# Patient Record
Sex: MALE | Race: WHITE | NOT HISPANIC OR LATINO | Employment: FULL TIME | URBAN - METROPOLITAN AREA
[De-identification: names, ages, dates, MRNs, and addresses within clinical notes are randomized per-mention and may not be internally consistent; named-entity substitution may affect disease eponyms.]

---

## 2017-01-09 ENCOUNTER — TELEPHONE (OUTPATIENT)
Dept: FAMILY MEDICINE | Facility: CLINIC | Age: 27
End: 2017-01-09

## 2017-01-09 NOTE — TELEPHONE ENCOUNTER
Spoke to Deuce letting him know that they never received the cardiac monitor back and it has been 30 days and will be marked lost after 45 days. Provided pt with the number to call [employer] at [phone].    Sagrario Worthy,

## 2017-01-09 NOTE — TELEPHONE ENCOUNTER
Kristin is calling from [employer] stating patient never returned cardiac monitor its been a month and will be marked lost on the 45th day. Thank you.

## 2017-01-23 ENCOUNTER — TELEPHONE (OUTPATIENT)
Dept: URGENT CARE | Facility: URGENT CARE | Age: 27
End: 2017-01-23

## 2017-01-23 NOTE — TELEPHONE ENCOUNTER
TC, please assist patient with cardiology appointment within 1 week per Meghan Dhaliwal NP.    Robyn Carlin RN

## 2017-01-23 NOTE — TELEPHONE ENCOUNTER
Was able to scheduled patient with Dr. Vasquez Pittman on Thurs 2/2/17 this is first available. Would you rather try and get him in sooner? You would have to do a doctor to doctor call.    Sagrario Worthy,

## 2017-01-23 NOTE — TELEPHONE ENCOUNTER
Patient is calling back to have provider help with scheduling the appointment with cardiology unable to get an appointment soon  Please call to discuss  Thank you

## 2017-02-02 ENCOUNTER — OFFICE VISIT (OUTPATIENT)
Dept: CARDIOLOGY | Facility: CLINIC | Age: 27
End: 2017-02-02
Attending: NURSE PRACTITIONER
Payer: COMMERCIAL

## 2017-02-02 VITALS
SYSTOLIC BLOOD PRESSURE: 113 MMHG | HEIGHT: 72 IN | WEIGHT: 202.5 LBS | DIASTOLIC BLOOD PRESSURE: 73 MMHG | HEART RATE: 86 BPM | OXYGEN SATURATION: 97 % | BODY MASS INDEX: 27.43 KG/M2

## 2017-02-02 DIAGNOSIS — R00.2 PALPITATIONS: Primary | ICD-10-CM

## 2017-02-02 LAB
ANION GAP SERPL CALCULATED.3IONS-SCNC: 9 MMOL/L (ref 3–14)
BUN SERPL-MCNC: 15 MG/DL (ref 7–30)
CALCIUM SERPL-MCNC: 8.8 MG/DL (ref 8.5–10.1)
CHLORIDE SERPL-SCNC: 107 MMOL/L (ref 94–109)
CO2 SERPL-SCNC: 26 MMOL/L (ref 20–32)
CREAT SERPL-MCNC: 0.91 MG/DL (ref 0.66–1.25)
ERYTHROCYTE [DISTWIDTH] IN BLOOD BY AUTOMATED COUNT: 13.4 % (ref 10–15)
GFR SERPL CREATININE-BSD FRML MDRD: NORMAL ML/MIN/1.7M2
GLUCOSE SERPL-MCNC: 93 MG/DL (ref 70–99)
HCT VFR BLD AUTO: 46.9 % (ref 40–53)
HGB BLD-MCNC: 16.6 G/DL (ref 13.3–17.7)
MAGNESIUM SERPL-MCNC: 2.5 MG/DL (ref 1.6–2.3)
MCH RBC QN AUTO: 30.7 PG (ref 26.5–33)
MCHC RBC AUTO-ENTMCNC: 35.4 G/DL (ref 31.5–36.5)
MCV RBC AUTO: 87 FL (ref 78–100)
PLATELET # BLD AUTO: 213 10E9/L (ref 150–450)
POTASSIUM SERPL-SCNC: 4.2 MMOL/L (ref 3.4–5.3)
RBC # BLD AUTO: 5.4 10E12/L (ref 4.4–5.9)
SODIUM SERPL-SCNC: 142 MMOL/L (ref 133–144)
TSH SERPL DL<=0.005 MIU/L-ACNC: 2.36 MU/L (ref 0.4–4)
WBC # BLD AUTO: 5.8 10E9/L (ref 4–11)

## 2017-02-02 PROCEDURE — 80048 BASIC METABOLIC PNL TOTAL CA: CPT | Performed by: INTERNAL MEDICINE

## 2017-02-02 PROCEDURE — 85027 COMPLETE CBC AUTOMATED: CPT | Performed by: INTERNAL MEDICINE

## 2017-02-02 PROCEDURE — 84443 ASSAY THYROID STIM HORMONE: CPT | Performed by: INTERNAL MEDICINE

## 2017-02-02 PROCEDURE — 36415 COLL VENOUS BLD VENIPUNCTURE: CPT | Performed by: INTERNAL MEDICINE

## 2017-02-02 PROCEDURE — 99203 OFFICE O/P NEW LOW 30 MIN: CPT | Mod: 25 | Performed by: INTERNAL MEDICINE

## 2017-02-02 PROCEDURE — 93000 ELECTROCARDIOGRAM COMPLETE: CPT | Performed by: INTERNAL MEDICINE

## 2017-02-02 PROCEDURE — 83735 ASSAY OF MAGNESIUM: CPT | Performed by: INTERNAL MEDICINE

## 2017-02-02 NOTE — MR AVS SNAPSHOT
After Visit Summary   2/2/2017    Deuce Cheung    MRN: [id number]           Patient Information     Date Of Birth          1990        Visit Information        Provider Department      2/2/2017 10:00 AM Vasquez Pittman MD [facility]        Today's Diagnoses     Palpitations    -  1        Follow-ups after your visit        Your next 10 appointments already scheduled     Feb 07, 2017  2:00 PM   Ech Complete with MGECHR1, MG ECHO TECH   [facility])    [address] [place] [address] [phone]           1.  Please bring or wear a comfortable two-piece outfit. 2.  You may eat, drink and take your normal medicines. 3.  For any questions that cannot be answered, please contact the ordering physician            Feb 07, 2017  3:00 PM   Event Monitor with MG DEVICE RM, MG CV TECH   [facility]    [address] [place] [address] [phone]              Future tests that were ordered for you today     Open Future Orders        Priority Expected Expires Ordered    Cardiac Event Monitor - Peds/Adult Routine  2/2/2018 2/2/2017    Echocardiogram Routine  2/2/2018 2/2/2017            Who to contact     If you have questions or need follow up information about today's clinic visit or your schedule please contact [facility] directly at [phone].  Normal or non-critical lab and imaging results will be communicated to you by MyChart, letter or phone within 4 business days after the clinic has received the results. If you do not hear from us within 7 days, please contact the clinic through MyChart or phone. If you have a critical or abnormal lab result, we will notify you by phone as soon as possible.  Submit refill requests through [employer] or call your pharmacy and they will forward the refill request to us. Please allow 3 business days for  your refill to be completed.          Additional Information About Your Visit        [employer] Information     [employer] gives you secure access to your electronic health record. If you see a primary care provider, you can also send messages to your care team and make appointments. If you have questions, please call your primary care clinic.  If you do not have a primary care provider, please call [phone] and they will assist you.      [employer] is an electronic gateway that provides easy, online access to your medical records. With [employer], you can request a clinic appointment, read your test results, renew a prescription or communicate with your care team.     To access your existing account, please contact your [facility] or call [phone] for assistance.        Care EveryWhere ID     This is your Care EveryWhere ID. This could be used by other organizations to access your [place] medical records  [id number]        Your Vitals Were     Pulse Height BMI (Body Mass Index) Pulse Oximetry          86 1.829 m (6') 27.46 kg/m2 97%         Blood Pressure from Last 3 Encounters:   02/02/17 113/73    Weight from Last 3 Encounters:   02/02/17 91.853 kg (202 lb 8 oz)   12/12/16 90.81 kg (200 lb 3.2 oz)              We Performed the Following     Basic metabolic panel     Cardiac Event Monitor - Peds/Adult     CBC with platelets     Magnesium     TSH with free T4 reflex        Primary Care Provider Office Phone # Fax #    [facility] [phone] [address] [facility] [address]        Thank you!     Thank you for choosing [facility]  for your care. Our goal is always to provide you with excellent care. Hearing back from our patients is one way we can continue to improve our services. Please take a few minutes to complete the written survey that you may receive in the mail after your visit with us. Thank you!             Your Updated  Medication List - Protect others around you: Learn how to safely use, store and throw away your medicines at www.disposemymeds.org.      Notice  As of 2/2/2017 10:45 AM    You have not been prescribed any medications.

## 2017-02-02 NOTE — NURSING NOTE
Deuce Cheung's goals for this visit include:   Chief Complaint   Patient presents with     Consult       He requests these members of his care team be copied on today's visit information: No    PCP: A[facility] [place]    Referring Provider:  PEEWEE Brar CNP  [address]  [place] [address]    Chief Complaint   Patient presents with     Consult       Initial Wt 91.853 kg (202 lb 8 oz) There is no height on file to calculate BMI.  BP completed using cuff size: regular

## 2017-02-02 NOTE — PROGRESS NOTES
"2017               PEEWEE William, CNP   [address], [address]   [place] [address]         RE:  Deuce Cheung   MRN:  [id number]   :  1990      Dr. Ms. Dhaliwal:      It was a pleasure participating in the care of your patient, Mr. Deuce Cheung.  As you know, he is a 26-year-old gentleman whom I see today for palpitations.      His past medical history is significant for the followin.  Left ankle fracture.      He denies other significant hospitalizations or surgeries.        His cardiac history is significant for a verbal history of some form of ablation for palpitations at a very young age when he was 6.  He says [facility].      He says that after his ablation, he was doing well until approximately 6-7 months ago when he began experiencing palpitations once again.  He says that he experiences his episodes as a \"stopping or quivering\" of his heart at which time he feels his vision blur and he becomes dizzy.  He has not passed out and he has not come close to passing out.  He says the symptoms last for about 5-10 minutes and they occur sporadically anywhere from once a week to 2-3 times a week.      He cannot correlate or predict when these episodes come on, however, the thing that most closely correlates is the amount of mental stress he is under and during stressful situations he may feel that these will trigger it.      He did wear a Zio Patch monitor on 2016, however, he did not experience any of the symptoms of dizziness during the monitoring.  The monitor revealed only normal sinus rhythm with rare ectopy.      He otherwise denies having any significant chest pain or problems breathing, PND, orthopnea, edema, syncope or near-syncope.      In terms of his cardiac risk factors, no history of diabetes or hypertension.  He smokes 1/2 pack a day and has done so for 6-7 " "years.  Rare alcohol use.  He drinks 1 can of pop and perhaps a cup of coffee once or twice a week.  Denies family history of heart disease.  He does not know his cholesterol.        He is a .  His wife works as an .      CURRENT MEDICATIONS:  He is on no medications.      PHYSICAL EXAMINATION:     VITAL SIGNS:  Blood pressure is 113/73 with a pulse of 86.  His weight is 202 pounds.   NECK:  Exam reveals no obvious jugular venous distention.   LUNGS:  Clear to auscultation.  Respiratory effort is normal.   CARDIAC:  Reveals a regular rate and rhythm and no obvious murmur or gallop appreciated.   ABDOMEN:  Belly soft, nontender.   EXTREMITIES:  Without gross edema.      EKG reveals normal sinus rhythm at a rate of 79 beats per minute, no gross ST changes.  Zio Patch monitor 12/12/2016 reveals normal sinus rhythm with greater PACs and PVCs.  Symptoms correlated to normal sinus rhythm plus or minus a PAC.  No labs.        IMPRESSION:      Deuce is a 26-year-old gentleman whose cardiac history is significant for a verbal history of having some form of electrophysiologic ablation procedure performed at age 6 at [facility].  Old records are not currently available for review.  He did well until 6-7 months ago when he began experiencing new palpitations described as a \"quivering\" of his heart that made him feel dizzy and with blurred vision.  He has not had true syncope or near-syncope.  The episodes occur for 5-10 minutes anywhere from once a week to 2-3 times a day.      Unfortunately, he did not experience any of his episodes while wearing a Zio Patch monitor in 12/2016, which was essentially unremarkable.      Further noninvasive evaluation would be indicated.        PLAN:     1.  Routine lab work including a chem panel, thyroid studies and magnesium.     2.  Echocardiogram to rule out significant structural pathology.     3.  A 30-day event monitor in " order to hopefully capture one of his episodes of dizziness and palpitations and to hopefully more definitively define an underlying rhythm and diagnosis.     4.  Further updates will follow pending the above test results.     5.  He was also encouraged to abstain from caffeine, alcohol and smoking.      Once again, it was a pleasure participating in the care of your patient, Mr. Deuce Gastelum.  Please feel free to contact me anytime if you have questions regarding his care in the future.         Sincerely,      NADIYA HUNTER MD             D: 2017 10:39   T: 2017 12:30   MT:       Name:     DEUCE GASTELUM   MRN:      [id number]        Account:      [id number]   :      1990      Document: [id number]       cc: Meghan VIDES, CNP

## 2017-02-07 ENCOUNTER — ALLIED HEALTH/NURSE VISIT (OUTPATIENT)
Dept: CARDIOLOGY | Facility: CLINIC | Age: 27
End: 2017-02-07
Attending: INTERNAL MEDICINE
Payer: COMMERCIAL

## 2017-02-07 DIAGNOSIS — R00.2 PALPITATIONS: ICD-10-CM

## 2017-02-07 PROCEDURE — 93272 ECG/REVIEW INTERPRET ONLY: CPT

## 2017-02-07 PROCEDURE — 93270 REMOTE 30 DAY ECG REV/REPORT: CPT

## 2017-02-07 PROCEDURE — 93306 TTE W/DOPPLER COMPLETE: CPT

## 2017-02-07 RX ADMIN — Medication 3 ML: at 14:45

## 2017-02-07 NOTE — NURSING NOTE
Patient presents today for resting echo ordered by MD.     Echo Tech provided patient education about resting echo. IV started in RAC.   IV start documented in  [employer].  Echo technician completed resting echo. Patient monitored according to Optison protocol. Data transferred to [facility] for final interpretation through [employer].     Optison medication:  Amount used 3ml Optison mixed with 6ml Saline - [id number]   After completion of resting echo, IV removed.    Patient education provided about cardiology interpretation and primary provider will be notified of results.    Loni Foreman RDCS

## 2018-03-13 ENCOUNTER — OFFICE VISIT (OUTPATIENT)
Dept: FAMILY MEDICINE | Facility: CLINIC | Age: 28
End: 2018-03-13
Payer: COMMERCIAL

## 2018-03-13 ENCOUNTER — TELEPHONE (OUTPATIENT)
Dept: FAMILY MEDICINE | Facility: CLINIC | Age: 28
End: 2018-03-13

## 2018-03-13 VITALS
OXYGEN SATURATION: 98 % | SYSTOLIC BLOOD PRESSURE: 121 MMHG | DIASTOLIC BLOOD PRESSURE: 77 MMHG | BODY MASS INDEX: 28.04 KG/M2 | HEIGHT: 68 IN | HEART RATE: 69 BPM | TEMPERATURE: 97.2 F | WEIGHT: 185 LBS

## 2018-03-13 DIAGNOSIS — Z23 NEED FOR TDAP VACCINATION: ICD-10-CM

## 2018-03-13 DIAGNOSIS — G44.219 EPISODIC TENSION-TYPE HEADACHE, NOT INTRACTABLE: Primary | ICD-10-CM

## 2018-03-13 LAB
BASOPHILS # BLD AUTO: 0 10E9/L (ref 0–0.2)
BASOPHILS NFR BLD AUTO: 0.1 %
DIFFERENTIAL METHOD BLD: NORMAL
EOSINOPHIL # BLD AUTO: 0.3 10E9/L (ref 0–0.7)
EOSINOPHIL NFR BLD AUTO: 3.8 %
ERYTHROCYTE [DISTWIDTH] IN BLOOD BY AUTOMATED COUNT: 14 % (ref 10–15)
HCT VFR BLD AUTO: 49.6 % (ref 40–53)
HGB BLD-MCNC: 16.8 G/DL (ref 13.3–17.7)
LYMPHOCYTES # BLD AUTO: 2.2 10E9/L (ref 0.8–5.3)
LYMPHOCYTES NFR BLD AUTO: 31.9 %
MCH RBC QN AUTO: 30.5 PG (ref 26.5–33)
MCHC RBC AUTO-ENTMCNC: 33.9 G/DL (ref 31.5–36.5)
MCV RBC AUTO: 90 FL (ref 78–100)
MONOCYTES # BLD AUTO: 0.7 10E9/L (ref 0–1.3)
MONOCYTES NFR BLD AUTO: 9.5 %
NEUTROPHILS # BLD AUTO: 3.8 10E9/L (ref 1.6–8.3)
NEUTROPHILS NFR BLD AUTO: 54.7 %
PLATELET # BLD AUTO: 195 10E9/L (ref 150–450)
RBC # BLD AUTO: 5.51 10E12/L (ref 4.4–5.9)
WBC # BLD AUTO: 6.9 10E9/L (ref 4–11)

## 2018-03-13 PROCEDURE — 84443 ASSAY THYROID STIM HORMONE: CPT | Performed by: PHYSICIAN ASSISTANT

## 2018-03-13 PROCEDURE — 90715 TDAP VACCINE 7 YRS/> IM: CPT | Performed by: PHYSICIAN ASSISTANT

## 2018-03-13 PROCEDURE — 99214 OFFICE O/P EST MOD 30 MIN: CPT | Mod: 25 | Performed by: PHYSICIAN ASSISTANT

## 2018-03-13 PROCEDURE — 36415 COLL VENOUS BLD VENIPUNCTURE: CPT | Performed by: PHYSICIAN ASSISTANT

## 2018-03-13 PROCEDURE — 80053 COMPREHEN METABOLIC PANEL: CPT | Performed by: PHYSICIAN ASSISTANT

## 2018-03-13 PROCEDURE — 90471 IMMUNIZATION ADMIN: CPT | Performed by: PHYSICIAN ASSISTANT

## 2018-03-13 PROCEDURE — 85025 COMPLETE CBC W/AUTO DIFF WBC: CPT | Performed by: PHYSICIAN ASSISTANT

## 2018-03-13 RX ORDER — CYCLOBENZAPRINE HCL 10 MG
5-10 TABLET ORAL 3 TIMES DAILY PRN
Qty: 90 TABLET | Refills: 1 | Status: SHIPPED | OUTPATIENT
Start: 2018-03-13 | End: 2018-12-19

## 2018-03-13 NOTE — TELEPHONE ENCOUNTER
Please triage patient. If first/worst headache of life, he needs to go to emergency room.     Michelle Treviño PA-C

## 2018-03-13 NOTE — PROGRESS NOTES
SUBJECTIVE:   Deuce Cheung is a 27 year old male who presents to clinic today for the following health issues:      Patient with history of palptiations who has seen cardiology and told to f/u pRN who now has headaches:      Headaches      Duration: Last couple of months patient has been having headaches once every two weeks, last two days pt has had severe headaches     Description  Location: low back of head and in the ears   Character: throbbing pain, sharp pain, squeezing pain  Frequency:  Throbs, then goes away, 1 every 2-3 minutes. Then every 10-15 seconds this morning   Duration:  All day. All night    Intensity:  2/10 right now 8/10 at worse     Accompanying signs and symptoms:    Precipitating or Alleviating factors:  Nausea/vomiting: Nausea yesterday   Dizziness: occasionally  Weakness or numbness: no  Visual changes: none  Fever: no   Sinus or URI symptoms no     History  Head trauma: no  Family history of migraines: no  Previous tests for headaches: no  Neurologist evaluations: no  Able to do daily activities when headache present: yes but struggled   Wake with headaches: YES  Daily pain medication use: YES- advil, tylenol PM, Excedrin   Any changes in: Yes- stress. Fraud.     Precipitating or Alleviating factors (light/sound/sleep/caffeine): hard to tell    Therapies tried and outcome: Ibuprofen (Advil, Motrin), Tylenol and Excedrin    Outcome - Advil most effective- dull  Frequent/daily pain medication use: YES- just for the headache        Was playing paintball wearing heavy gear then got headache the next day.  Lots of tension in neck.   No arm pain or numbness/tingling. No arm weakness.   No chest pain or shortness of breath.   Did have thyroid and labs checked a year ago with palpitations. Headaches started after per patient but have been going on for months off and on.     No lifestyle changes per patient.    Some extra stress, sister in law lives with them.  patient denies anxiety/depression  "but he seems like he has RAUL or at least anxiety regarding medical diagnosis just with talking to him today.      Has not seen neurologist.       Not the worst headache he has ever had.   Patient has no current headache, used a heating beanie on his neck and headache resolved.         Problem list and histories reviewed & adjusted, as indicated.  Additional history: as documented    There is no problem list on file for this patient.    History reviewed. No pertinent surgical history.    Social History   Substance Use Topics     Smoking status: Former Smoker     Smokeless tobacco: Never Used     Alcohol use Not on file     Family History   Problem Relation Age of Onset     Celiac Disease Maternal Grandmother          Current Outpatient Prescriptions   Medication Sig Dispense Refill     cyclobenzaprine (FLEXERIL) 10 MG tablet Take 0.5-1 tablets (5-10 mg) by mouth 3 times daily as needed for muscle spasms 90 tablet 1     No Known Allergies    Reviewed and updated as needed this visit by clinical staff       Reviewed and updated as needed this visit by Provider         ROS:  Constitutional, HEENT, cardiovascular, pulmonary, GI, , musculoskeletal, neuro, skin, endocrine and psych systems are negative, except as otherwise noted.    OBJECTIVE:     /77  Pulse 69  Temp 97.2  F (36.2  C) (Oral)  Ht 5' 8\" (1.727 m)  Wt 185 lb (83.9 kg)  SpO2 98%  BMI 28.13 kg/m2  Body mass index is 28.13 kg/(m^2).  GENERAL: healthy, alert and no distress  EYES: Eyes grossly normal to inspection, PERRL and conjunctivae and sclerae normal  HENT: ear canals and TM's normal, nose and mouth without ulcers or lesions  NECK: no adenopathy, no asymmetry, masses, or scars and thyroid normal to palpation  RESP: lungs clear to auscultation - no rales, rhonchi or wheezes  CV: regular rate and rhythm, normal S1 S2, no S3 or S4, no murmur, click or rub, no peripheral edema and peripheral pulses strong  MS: no gross musculoskeletal defects " noted, no edema  NEURO: Normal strength and tone, sensory exam grossly normal, mentation intact, speech normal, cranial nerves 2-12 intact, Romberg normal and rapid alternating movements normal  PSYCH: {alert, pleasant, seems mildly anxious/has lots of questions regarding things he could have/what he read on the Internet         ASSESSMENT/PLAN:   ASSESSMENT / PLAN:  (G44.219) Episodic tension-type headache, not intractable  (primary encounter diagnosis)  Comment: suspect tension/stress headaches and possibly migraines, no evidence of stroke or acute pathology as the cause, neuro exam normal and vitals normal.  PATIENT HAS NO HEADACHE CURRENTLy.   See more below  Plan: CBC with platelets differential, Comprehensive         metabolic panel, TSH with free T4 reflex,         NEUROLOGY ADULT REFERRAL, cyclobenzaprine         (FLEXERIL) 10 MG tablet, TDAP VACCINE (ADACEL),        ADMIN 1st VACCINE, SCREENING QUESTIONS FOR         ADULT IMMUNIZATIONS            (Z23) Need for Tdap vaccination  Comment:   Plan:     Patient Instructions   Do not mix flexeril with alcohol or drive after taking (muscle relaxant)    Continue heat topically    To emergency room with worsening or new symptoms    Schedule with neurology            Billin min spent face-to-face with patient. 20 min on history, 4 on exam, 1 on discussing diagnosis and treatment plan.       Michelle Treviño PA-C  [facility]

## 2018-03-13 NOTE — PATIENT INSTRUCTIONS
Do not mix flexeril with alcohol or drive after taking (muscle relaxant)    Continue heat topically    To emergency room with worsening or new symptoms    Schedule with neurology

## 2018-03-13 NOTE — MR AVS SNAPSHOT
After Visit Summary   3/13/2018    Deuce Cheung    MRN: [id number]           Patient Information     Date Of Birth          1990        Visit Information        Provider Department      3/13/2018 5:00 PM Michelle Treviño PA-C [facility]        Today's Diagnoses     Episodic tension-type headache, not intractable    -  1    Need for Tdap vaccination          Care Instructions    Do not mix flexeril with alcohol or drive after taking (muscle relaxant)    Continue heat topically    To emergency room with worsening or new symptoms    Schedule with neurology                  Follow-ups after your visit        Additional Services     NEUROLOGY ADULT REFERRAL       Your provider has referred you for the following:   Consult at [facility]: [facility] - [place] [phone]   [email or web address]    Please be aware that coverage of these services is subject to the terms and limitations of your health insurance plan.  Call member services at your health plan with any benefit or coverage questions.      Please bring the following with you to your appointment:    (1) Any X-Rays, CTs or MRIs which have been performed.  Contact the facility where they were done to arrange for  prior to your scheduled appointment.    (2) List of current medications  (3) This referral request   (4) Any documents/labs given to you for this referral                  Who to contact     If you have questions or need follow up information about today's clinic visit or your schedule please contact [facility] directly at [phone].  Normal or non-critical lab and imaging results will be communicated to you by MyChart, letter or phone within 4 business days after the clinic has received the results. If you do not hear from us within 7 days, please contact the clinic through MyChart or phone. If you have a critical or abnormal lab result, we  "will notify you by phone as soon as possible.  Submit refill requests through [employer] or call your pharmacy and they will forward the refill request to us. Please allow 3 business days for your refill to be completed.          Additional Information About Your Visit        [employer] Information     [employer] gives you secure access to your electronic health record. If you see a primary care provider, you can also send messages to your care team and make appointments. If you have questions, please call your primary care clinic.  If you do not have a primary care provider, please call [phone] and they will assist you.        Care EveryWhere ID     This is your Care EveryWhere ID. This could be used by other organizations to access your [place] medical records  [id number]        Your Vitals Were     Pulse Temperature Height Pulse Oximetry BMI (Body Mass Index)       69 97.2  F (36.2  C) (Oral) 5' 8\" (1.727 m) 98% 28.13 kg/m2        Blood Pressure from Last 3 Encounters:   03/13/18 121/77   02/02/17 113/73    Weight from Last 3 Encounters:   03/13/18 185 lb (83.9 kg)   02/02/17 202 lb 8 oz (91.9 kg)   12/12/16 200 lb 3.2 oz (90.8 kg)              We Performed the Following     ADMIN 1st VACCINE     CBC with platelets differential     Comprehensive metabolic panel     NEUROLOGY ADULT REFERRAL     SCREENING QUESTIONS FOR ADULT IMMUNIZATIONS     TDAP VACCINE (ADACEL)     TSH with free T4 reflex          Today's Medication Changes          These changes are accurate as of 3/13/18  6:06 PM.  If you have any questions, ask your nurse or doctor.               Start taking these medicines.        Dose/Directions    cyclobenzaprine 10 MG tablet   Commonly known as:  FLEXERIL   Used for:  Episodic tension-type headache, not intractable   Started by:  Michelle Treviño PA-C        Dose:  5-10 mg   Take 0.5-1 tablets (5-10 mg) by mouth 3 times daily as needed for muscle spasms   Quantity:  90 tablet   Refills:  1       "      Where to get your medicines      These medications were sent to [facility] - [place], [place] - [address] [address]  [address] [address] [address]     Phone:  [phone]     cyclobenzaprine 10 MG tablet                Primary Care Provider Office Phone # Fax #    [facility] [phone] [address] [facility] [address]        Equal Access to Services     SHERRIE CHOW : Hadii aad ku hadasho Soomaali, waaxda luqadaha, qaybta kaalmada adeegyada, waxay idiin hayaan adeeg kharapeter lachet . So [facility] [phone].    ATENCIÓN: Si habla español, tiene a burger disposición servicios gratuitos de asistencia lingüística. [facility] [phone].    We comply with applicable federal civil rights laws and [place] laws. We do not discriminate on the basis of race, color, national origin, age, disability, sex, sexual orientation, or gender identity.            Thank you!     Thank you for choosing [facility]  for your care. Our goal is always to provide you with excellent care. Hearing back from our patients is one way we can continue to improve our services. Please take a few minutes to complete the written survey that you may receive in the mail after your visit with us. Thank you!             Your Updated Medication List - Protect others around you: Learn how to safely use, store and throw away your medicines at www.disposemymeds.org.          This list is accurate as of 3/13/18  6:06 PM.  Always use your most recent med list.                   Brand Name Dispense Instructions for use Diagnosis    cyclobenzaprine 10 MG tablet    FLEXERIL    90 tablet    Take 0.5-1 tablets (5-10 mg) by mouth 3 times daily as needed for muscle spasms    Episodic tension-type headache, not intractable

## 2018-03-13 NOTE — LETTER
[facility]  [address] [facility] [address]  Phone: [phone]    March 13, 2018        Deuce Cheung  [address]   [facility] [address]          To whom it may concern:    RE: Deuce Cheung    Patient was seen and treated today at our clinic and missed work for acute illness.  They may be excused yesterday through Wednesday 3-14-18 and return when symptoms improve.       Please contact me for questions or concerns.      Sincerely,        Michelle Treviño PA-C

## 2018-03-13 NOTE — NURSING NOTE
"Chief Complaint   Patient presents with     Headache       Initial /77  Pulse 69  Temp 97.2  F (36.2  C) (Oral)  Ht 5' 8\" (1.727 m)  Wt 185 lb (83.9 kg)  SpO2 98%  BMI 28.13 kg/m2 Estimated body mass index is 28.13 kg/(m^2) as calculated from the following:    Height as of this encounter: 5' 8\" (1.727 m).    Weight as of this encounter: 185 lb (83.9 kg).  Medication Reconciliation: complete    Noreen Hayden MA    "

## 2018-03-13 NOTE — TELEPHONE ENCOUNTER
This writer attempted to contact pt on 03/13/18 at 1:44pm      Reason for call SYMPTOMS - triage per provider request below (pt has a scheduled 5pm appointment) and left message to return call and request to speak to RN.      If patient calls back:   [facility] RN team to see if anyone is available to take the call, and if RN team unavailable please transfer to [facility] for triage.    Therese Cueto, RN

## 2018-03-14 LAB
ALBUMIN SERPL-MCNC: 4.2 G/DL (ref 3.4–5)
ALP SERPL-CCNC: 39 U/L (ref 40–150)
ALT SERPL W P-5'-P-CCNC: 37 U/L (ref 0–70)
ANION GAP SERPL CALCULATED.3IONS-SCNC: 7 MMOL/L (ref 3–14)
AST SERPL W P-5'-P-CCNC: 24 U/L (ref 0–45)
BILIRUB SERPL-MCNC: 0.4 MG/DL (ref 0.2–1.3)
BUN SERPL-MCNC: 14 MG/DL (ref 7–30)
CALCIUM SERPL-MCNC: 8.9 MG/DL (ref 8.5–10.1)
CHLORIDE SERPL-SCNC: 107 MMOL/L (ref 94–109)
CO2 SERPL-SCNC: 28 MMOL/L (ref 20–32)
CREAT SERPL-MCNC: 0.95 MG/DL (ref 0.66–1.25)
GFR SERPL CREATININE-BSD FRML MDRD: >90 ML/MIN/1.7M2
GLUCOSE SERPL-MCNC: 81 MG/DL (ref 70–99)
POTASSIUM SERPL-SCNC: 4.3 MMOL/L (ref 3.4–5.3)
PROT SERPL-MCNC: 7.4 G/DL (ref 6.8–8.8)
SODIUM SERPL-SCNC: 142 MMOL/L (ref 133–144)
TSH SERPL DL<=0.005 MIU/L-ACNC: 1.83 MU/L (ref 0.4–4)

## 2018-03-14 NOTE — PROGRESS NOTES
Lasha Tripathi,       Your recent test results are attached, if you have any questions or concerns please feel free to contact me via e-mail or call [phone].  Labs normal. Thyroid normal. White and red blood cell counts normal.  No anemia.  Sodium, potassium, kidney and liver function normal.  Blood sugar normal, no diabetes.             It was a pleasure to see you at your recent office visit.      Sincerely,  Michelle Treviño PA-C

## 2018-12-19 ENCOUNTER — OFFICE VISIT (OUTPATIENT)
Dept: FAMILY MEDICINE | Facility: CLINIC | Age: 28
End: 2018-12-19
Payer: COMMERCIAL

## 2018-12-19 VITALS
SYSTOLIC BLOOD PRESSURE: 122 MMHG | BODY MASS INDEX: 27.74 KG/M2 | HEART RATE: 68 BPM | TEMPERATURE: 98 F | RESPIRATION RATE: 16 BRPM | HEIGHT: 68 IN | WEIGHT: 183 LBS | DIASTOLIC BLOOD PRESSURE: 78 MMHG | OXYGEN SATURATION: 99 %

## 2018-12-19 DIAGNOSIS — N50.812 TESTICULAR PAIN, LEFT: Primary | ICD-10-CM

## 2018-12-19 PROCEDURE — 99213 OFFICE O/P EST LOW 20 MIN: CPT | Performed by: PHYSICIAN ASSISTANT

## 2018-12-19 ASSESSMENT — MIFFLIN-ST. JEOR: SCORE: 1769.58

## 2018-12-19 NOTE — PROGRESS NOTES
"SUBJECTIVE:                                                    Deuce Cheung is a 28 year old male who presents to clinic today for the following health issues:    Testicle Pain       Duration: 2 years off and on     Description (location/character/radiation): left sided    Intensity:  moderate    Accompanying signs and symptoms:     History (similar episodes/previous evaluation): None    Precipitating or alleviating factors: pt was shot with a paintball gun x2.     Therapies tried and outcome: None   Pain comes and goes. Increase pain with touching testicle.   No dysuria or hematuria or abdominal pain.     Problem list and histories reviewed & adjusted, as indicated.  Additional history: as documented    There is no problem list on file for this patient.    No past surgical history on file.    Social History     Tobacco Use     Smoking status: Former Smoker     Smokeless tobacco: Never Used   Substance Use Topics     Alcohol use: Not on file     Family History   Problem Relation Age of Onset     Celiac Disease Maternal Grandmother          No current outpatient medications on file.     No Known Allergies    ROS:  Constitutional, HEENT, cardiovascular, pulmonary, gi and gu systems are negative, except as otherwise noted.    OBJECTIVE:     /78   Pulse 68   Temp 98  F (36.7  C) (Oral)   Resp 16   Ht 1.727 m (5' 8\")   Wt 83 kg (183 lb)   SpO2 99%   BMI 27.83 kg/m    Body mass index is 27.83 kg/m .  GENERAL: healthy, alert and no distress  RESP: lungs clear to auscultation - no rales, rhonchi or wheezes  CV: regular rate and rhythm, normal S1 S2, no S3 or S4, no murmur, click or rub, no peripheral edema and peripheral pulses strong  ABDOMEN: soft, nontender, no hepatosplenomegaly, no masses and bowel sounds normal   (male): normal male genitalia without lesions or urethral discharge, no hernia  MS: no gross musculoskeletal defects noted, no edema    Diagnostic Test Results:  Ultrasound " pending    ASSESSMENT/PLAN:       ICD-10-CM    1. Testicular pain, left N50.812 US Testicular and Scrotum   Ultrasound pending and fu with Urology  Discussed groin protection.     Shahram Hunter PA-C  [facility]

## 2018-12-19 NOTE — NURSING NOTE
"Chief Complaint   Patient presents with     Groin Swelling     Health Maintenance     flu       Initial /78   Pulse 68   Temp 98  F (36.7  C) (Oral)   Resp 16   Ht 1.727 m (5' 8\")   Wt 83 kg (183 lb)   SpO2 99%   BMI 27.83 kg/m   Estimated body mass index is 27.83 kg/m  as calculated from the following:    Height as of this encounter: 1.727 m (5' 8\").    Weight as of this encounter: 83 kg (183 lb).  Medication Reconciliation: complete  Kacey Marshall CMA    "

## 2020-02-23 ENCOUNTER — HEALTH MAINTENANCE LETTER (OUTPATIENT)
Age: 30
End: 2020-02-23

## 2020-04-17 ENCOUNTER — VIRTUAL VISIT (OUTPATIENT)
Dept: FAMILY MEDICINE | Facility: OTHER | Age: 30
End: 2020-04-17

## 2020-04-17 NOTE — PROGRESS NOTES
"Date: 2020 14:02:25  Clinician: Deborah Salvador  Clinician NPI: [id number]  Patient: Deuce Cheung  Patient : 1990  Patient Address: [address] [place] [address]  Patient Phone: [phone]  Visit Protocol: IBS  Patient Summary:  Deuce is a 29 year old ( : 1990 ) male who initiated a Visit for evaluation of IBS. When asked the question \"Please sign me up to receive news, health information and promotions from [employer].\", Deuce responded \"No\".    In the last 3 months, he notes experiencing abdominal pain or discomfort everyday. He has been experiencing discomfort or pain for less than 6 months. With regard to the abdominal pain, the patient notes:     The pain sometimes got better or stopped completely after a bowl movement    Sometimes having more frequent bowel movements after the pain started    Sometimes having less frequent bowel movements after the pain started    Often having looser or less solid stools when the pain started    Never having harder stools when the pain started     In the last 3 months, he experienced the following:     Hard or lumpy stools: about 25% of the time     Loose, mushy or watery stools: about 50% of the time     Blood in the stool: never     Black stools: never     Vomited blood: never     Sharp abdominal pain     The patient denies the following: abdominal pain that interferes with sleep, diarrhea that interferes with sleep, pain with urination, feeling feverish, recent abdominal trauma or injury, increased urination frequency, reflux, heartburn, unintentional weight loss, and back pain associated with stomach symptoms.   The patient has NOT modified his diet to treat these symptoms.   The patient has a parent, brother, or sister who has or has had Celiac disease.   The patient does not smoke or use smokeless tobacco.   Additional information as reported by the patient (free text): just a constant dull pain in my abdomen with occasional more " extreme sharp pains. Pain started more centralized directly below my diaphragm area but has moved to the right side of my abdomen. I've only noticed an occasional mild fever and my pain has never been severe, just nagging. Seems to feel better momentarily after eating but gets worse about half hour to an hour after eating. I have not taken any over the counter meds to counter pain.     MEDICATIONS: No current medications, ALLERGIES: NKDA  Clinician Response:  Dear Deuce,  Based on the information you provided, you likely have a Functional Digestive Disorder, which is a general term describing abnormal function of the bowels.  Unless you are allergic to the over-the-counter medication(s) below, I recommend using:       Methylcellulose (Citrucel). Take 1 scoop orally 1-3 times a day mixed with water. This is an over-the-counter medication that does not require a prescription.      Polyethylene glycol (Miralax). Take 17g dissolved in 8oz of water once a day as needed. Once bowel movements are soft, reduce or eliminate dosing/frequency. This is an over-the-counter medication that does not require a prescription.     If you do not experience any improvement within two to four weeks, you should be seen by your primary care provider.   If you develop worsening abdominal pain with nausea, vomiting, or blood in stool, be seen by your primary care provider, urgent care clinic, or emergency department.   Diagnosis: Other functional disorders of intestine  Diagnosis ICD: K59.8

## 2020-04-21 ENCOUNTER — OFFICE VISIT (OUTPATIENT)
Dept: URGENT CARE | Facility: URGENT CARE | Age: 30
End: 2020-04-21
Payer: COMMERCIAL

## 2020-04-21 ENCOUNTER — E-VISIT (OUTPATIENT)
Dept: FAMILY MEDICINE | Facility: CLINIC | Age: 30
End: 2020-04-21
Payer: COMMERCIAL

## 2020-04-21 ENCOUNTER — ANCILLARY PROCEDURE (OUTPATIENT)
Dept: GENERAL RADIOLOGY | Facility: CLINIC | Age: 30
End: 2020-04-21
Attending: INTERNAL MEDICINE
Payer: COMMERCIAL

## 2020-04-21 VITALS
SYSTOLIC BLOOD PRESSURE: 110 MMHG | DIASTOLIC BLOOD PRESSURE: 78 MMHG | TEMPERATURE: 97.4 F | HEART RATE: 78 BPM | OXYGEN SATURATION: 99 %

## 2020-04-21 DIAGNOSIS — R10.84 ABDOMINAL PAIN, GENERALIZED: Primary | ICD-10-CM

## 2020-04-21 DIAGNOSIS — K59.00 CONSTIPATION, UNSPECIFIED CONSTIPATION TYPE: ICD-10-CM

## 2020-04-21 DIAGNOSIS — Z53.9 ERRONEOUS ENCOUNTER--DISREGARD: Primary | ICD-10-CM

## 2020-04-21 DIAGNOSIS — R10.84 ABDOMINAL PAIN, GENERALIZED: ICD-10-CM

## 2020-04-21 PROCEDURE — 99214 OFFICE O/P EST MOD 30 MIN: CPT | Performed by: INTERNAL MEDICINE

## 2020-04-21 PROCEDURE — 74019 RADEX ABDOMEN 2 VIEWS: CPT

## 2020-04-21 RX ORDER — BISACODYL 10 MG
10 SUPPOSITORY, RECTAL RECTAL DAILY PRN
Qty: 5 SUPPOSITORY | Refills: 0 | Status: SHIPPED | OUTPATIENT
Start: 2020-04-21 | End: 2021-10-18

## 2020-04-21 RX ORDER — DOCUSATE SODIUM 100 MG/1
100 CAPSULE, LIQUID FILLED ORAL 2 TIMES DAILY
Qty: 60 CAPSULE | Refills: 0 | Status: SHIPPED | OUTPATIENT
Start: 2020-04-21 | End: 2021-10-18

## 2020-04-21 NOTE — PROGRESS NOTES
SUBJECTIVE:  Deuce Cheung is an 29 year old male who presents for abdominal pain.  Started about three weeks ago.  Was on keto diet for a while and then a few weeks ago stopped the keto diet.  acouple days later had some abdominal pain for about 4 days, then improved.  Then a few days later pain recurred again.  Is not having very frequent BMs.  Pain has gradually worsened and feels bloated constantly and then will have sharper more focused pains for a few minutes but not more than 30 minutes.  No vomiting.  No nausea unless the pain is very severe, but then passes.  No fevers.  No cough or runny nose.  Did an oncare visit and advised to take miralax daily which he has been doing, but hasn't relieved his sxs.  Since starting miralax has had a very small BM daily but not normal amount.   No other meds taken for sxs. No blood or mucous in stools.  No black stools.  No recent travel.  No one else at home with similar sxs.  Eating okay but after eats will get burst of pain about 30 minutes later.      PMH:  neg    Social History     Socioeconomic History     Marital status:      Spouse name: Not on file     Number of children: Not on file     Years of education: Not on file     Highest education level: Not on file   Occupational History     Not on file   Social Needs     Financial resource strain: Not on file     Food insecurity     Worry: Not on file     Inability: Not on file     Transportation needs     Medical: Not on file     Non-medical: Not on file   Tobacco Use     Smoking status: Former Smoker     Smokeless tobacco: Never Used   Substance and Sexual Activity     Alcohol use: Not on file     Drug use: Not on file     Sexual activity: Not on file   Lifestyle     Physical activity     Days per week: Not on file     Minutes per session: Not on file     Stress: Not on file   Relationships     Social connections     Talks on phone: Not on file     Gets together: Not on file     Attends [employer]ism service: Not  on file     Active member of club or organization: Not on file     Attends meetings of clubs or organizations: Not on file     Relationship status: Not on file     Intimate partner violence     Fear of current or ex partner: Not on file     Emotionally abused: Not on file     Physically abused: Not on file     Forced sexual activity: Not on file   Other Topics Concern     Parent/sibling w/ CABG, MI or angioplasty before 65F 55M? Not Asked   Social History Narrative     Not on file     Family History   Problem Relation Age of Onset     Celiac Disease Maternal Grandmother        ALLERGIES:  Patient has no known allergies.    No current outpatient medications on file.     No current facility-administered medications for this visit.          ROS:  ROS is done and is negative for general/constitutional, eye, ENT, Respiratory, cardiovascular, GI, , Skin, musculoskeletal except as noted elsewhere.  All other review of systems negative except as noted elsewhere.      OBJECTIVE:  /78   Pulse 78   Temp 97.4  F (36.3  C)   SpO2 99%   GENERAL APPEARANCE: Alert, in no acute distress  EYES: normal  NOSE:normal  OROPHARYNX:normal  NECK:No adenopathy,masses or thyromegaly  RESP: normal and clear to auscultation  CV:regular rate and rhythm and no murmurs, clicks, or gallops  ABDOMEN: Abdomen soft. BS normal. Mild to moderate tenderness over left upper and lower abdomen, no rebound. No masses, organomegaly  SKIN: no ulcers, lesions or rash  MUSCULOSKELETAL:Musculoskeletal normal      RESULTS  Xrays of abdomen : moderate stool present, no free air noted, on my reading.  Await radiology reading      ASSESSMENT/PLAN:    ASSESSMENT / PLAN:  (R10.84) Abdominal pain, generalized  (primary encounter diagnosis)  Comment: currently most c/w constipation.  Currently not c/w appendicitis based on lack of fevers, location of pain, and length of sxs.  Plan: XR Abdomen 2 Views        Treat as below.    (K59.00) Constipation, unspecified  constipation type  Comment: sxs c/w constipation, likely triggered by dietary changes recently.  Will have pt increase miralax to bid for 3-5 days and add colace and prn culcolax  Plan: docusate sodium (COLACE) 100 MG capsule,         bisacodyl (DULCOLAX) 10 MG suppository        Reviewed medication instructions and side effects. Follow up if experiences side effects.  See pt instructions for miralax and miralax taper.  I reviewed supportive care, otc meds to use if needed, expected course, and signs of concern.  Follow up as needed or if he does not improve within 7 day(s) or if worsens in any way.  Reviewed red flag symptoms and is to go to the ER if experiences any of these.      See [facility] for orders, medications, letters, patient instructions    Michelle Cruz M.D.

## 2020-04-21 NOTE — PATIENT INSTRUCTIONS
Take miralax full dose twice a day for 3-5 days.  Then, if stools are too loose, decrease to 1 dose daily.  If stools are still too loose, then decrease to 1/2 dose every day, then 1/2 dose every other day until stools are no longer too loose.  You may take a full dose of miralax daily if needed, and can take it for as long as needed.    You need to take at least 1/2 dose twice a week for a month to allow time for your bowel to recover and return to normal function.

## 2020-12-06 ENCOUNTER — HEALTH MAINTENANCE LETTER (OUTPATIENT)
Age: 30
End: 2020-12-06

## 2021-04-11 ENCOUNTER — HEALTH MAINTENANCE LETTER (OUTPATIENT)
Age: 31
End: 2021-04-11

## 2021-09-25 ENCOUNTER — HEALTH MAINTENANCE LETTER (OUTPATIENT)
Age: 31
End: 2021-09-25

## 2021-10-18 ENCOUNTER — OFFICE VISIT (OUTPATIENT)
Dept: FAMILY MEDICINE | Facility: CLINIC | Age: 31
End: 2021-10-18
Payer: COMMERCIAL

## 2021-10-18 VITALS
WEIGHT: 192 LBS | SYSTOLIC BLOOD PRESSURE: 126 MMHG | DIASTOLIC BLOOD PRESSURE: 86 MMHG | TEMPERATURE: 98.1 F | BODY MASS INDEX: 29.1 KG/M2 | HEART RATE: 87 BPM | HEIGHT: 68 IN | OXYGEN SATURATION: 100 %

## 2021-10-18 DIAGNOSIS — M54.6 ACUTE RIGHT-SIDED THORACIC BACK PAIN: Primary | ICD-10-CM

## 2021-10-18 DIAGNOSIS — R21 RASH AND NONSPECIFIC SKIN ERUPTION: ICD-10-CM

## 2021-10-18 PROCEDURE — 99213 OFFICE O/P EST LOW 20 MIN: CPT | Performed by: PHYSICIAN ASSISTANT

## 2021-10-18 ASSESSMENT — PAIN SCALES - GENERAL: PAINLEVEL: MILD PAIN (2)

## 2021-10-18 ASSESSMENT — MIFFLIN-ST. JEOR: SCORE: 1795.41

## 2021-10-18 NOTE — NURSING NOTE
"Chief Complaint   Patient presents with     Derm Problem     Back Pain       Initial /86   Pulse 87   Temp 98.1  F (36.7  C) (Tympanic)   Ht 1.727 m (5' 8\")   Wt 87.1 kg (192 lb)   SpO2 100%   BMI 29.19 kg/m   Estimated body mass index is 29.19 kg/m  as calculated from the following:    Height as of this encounter: 1.727 m (5' 8\").    Weight as of this encounter: 87.1 kg (192 lb).  Medication Reconciliation: complete    OSCAR Linares MA    "

## 2021-10-18 NOTE — PROGRESS NOTES
"    Assessment & Plan     Acute right-sided thoracic back pain  Muscular pain.   Discussed use of NSAIDS / heat / ice during exacerbation.   Plan to work with Physical Therapy for strengthening and exercises.   - [facility]; Future    Rash and nonspecific skin eruption  Rash is resolved  Most likely ringworm.   OTC antifungal products discussed.                  BMI:   Estimated body mass index is 29.19 kg/m  as calculated from the following:    Height as of this encounter: 1.727 m (5' 8\").    Weight as of this encounter: 87.1 kg (192 lb).   Did not discussed weight.         Return in about 1 year (around 10/18/2022) for Routine Visit.    Kristen M. Kehr, PA-C [facility]    Tunde Tripathi is a 31 year old who presents for the following health issues     HPI     Back Pain  Onset/Duration: years, but recent exacerbation from lifting. Pain will come and go. Worse with activity. 3 weeks ago, he picked up something heavy and made it worse. It is getting better again, but not sure how to treat.   Description:   Location of pain: right off to the side of the spine per patient  Character of pain:  Varies, but when having pain it's a sharp pain  Pain radiation: radiates into the right buttocks  New numbness or weakness in legs, not attributed to pain: no   Intensity: Currently 1-2/10  Progression of Symptoms: worse  History:   Specific cause: activity at work a week ago  Pain interferes with job: Sometime  History of back problems: ongoing for awhile  Any previous MRI or X-rays: None  Sees a specialist for back pain: No  Alleviating factors:   Improved by: resting    Precipitating factors:  Worsened by: with a lot of activities   Therapies tried and outcome:     Accompanying Signs & Symptoms:  Risk of Fracture: None  Risk of Cauda Equina: None  Risk of Infection: None  Risk of Cancer: None  Risk of Ankylosing Spondylitis: Onset at age <35, male, AND morning back stiffness " "no            Derm concern on armpits, butt and groin area. Dark red circular rash, now resolved.   He did not use any medication.   Thinks it may have been spread from the dog. The dog had a circular lesion on his skin, but that is gone now too.     Review of Systems   Constitutional, HEENT, cardiovascular, pulmonary, GI, , musculoskeletal, neuro, skin, endocrine and psych systems are negative, except as otherwise noted.      Objective    /86   Pulse 87   Temp 98.1  F (36.7  C) (Tympanic)   Ht 1.727 m (5' 8\")   Wt 87.1 kg (192 lb)   SpO2 100%   BMI 29.19 kg/m    Body mass index is 29.19 kg/m .  Physical Exam   GENERAL: healthy, alert and no distress  MS: no gross musculoskeletal defects noted, no edema  SKIN: no suspicious lesions or rashes  NEURO: Normal strength and tone, mentation intact and speech normal  Comprehensive back pain exam:  Tenderness of right thoracic muscles, Range of motion not limited by pain and upper and lower extremity strength intact  PSYCH: mentation appears normal, affect normal/bright                "

## 2022-05-07 ENCOUNTER — HEALTH MAINTENANCE LETTER (OUTPATIENT)
Age: 32
End: 2022-05-07

## 2022-06-01 ENCOUNTER — TELEPHONE (OUTPATIENT)
Dept: FAMILY MEDICINE | Facility: CLINIC | Age: 32
End: 2022-06-01
Payer: COMMERCIAL

## 2022-06-01 NOTE — TELEPHONE ENCOUNTER
Wife, Jacquie calling on patient's behalf, (ot provides verbal permission to speak with wife) patient was seen at emergency department 5/28/22 and evaluated for Head injury.  Patient had an episode of syncope that resulted in the fall and hit his head on the floor of the deck.  Wife reports patient's discharge instructions advised to see Cardiologist and PCP.  Patient does not have a pcp.  Wife reports patient complains of dizziness, primarily constant, but has episodes of improvement.  Headache is constant, no improvement with Tylenol.  Able to walk on his own, but afraid to get out of bed due to dizziness.      Information above is reviewed with Dr Ibrahim, Verbal Orders check with [facility] if this is something they would manage.  Call to [facility], spoke with provider Bello.  Discussed above, more appropriate for emergency department due to not having the potential imaging that is needed and should have a Neurology evaluation.         Information above is reviewed with Dr Ibrahim, Verbal Orders to send patient to emergency department.       Call to wife, Lengthy discussion regarding concern for a potential bleed that was not identified at intially, appropriate follow up, potential emergent concern.      Verbalized good understanding.        Routing to provider to cosign Verbal Orders.     Jessica Hancock, RN     AGree with plan    Kathryn Rader MD

## 2022-08-01 ENCOUNTER — NURSE TRIAGE (OUTPATIENT)
Dept: NURSING | Facility: CLINIC | Age: 32
End: 2022-08-01

## 2022-08-01 NOTE — TELEPHONE ENCOUNTER
Patient working this weekend , and while using a metal drill, he states there is something that got imbeded in his eye, and today it seems to be getting more painful, and he  is not able to get it out,    He was advised to go to the ER . He states he will go to [facility].    Nessa Villanueva RN   [facility] Nurse Advisor      Reason for Disposition    Foreign body (FB) stuck on eyeball    Additional Information    Negative: Doesn't sound like FB in the eye    Negative: Foreign body is a chemical    Negative: Foreign body (FB) hit eye at high speed (e.g., small metallic chip from hammering, lawnmower, BB gun, explosion)    Protocols used: EYE - FOREIGN BODY-A-OH

## 2023-01-07 ENCOUNTER — HEALTH MAINTENANCE LETTER (OUTPATIENT)
Age: 33
End: 2023-01-07

## 2023-06-02 ENCOUNTER — HEALTH MAINTENANCE LETTER (OUTPATIENT)
Age: 33
End: 2023-06-02

## 2023-07-14 ENCOUNTER — HOSPITAL ENCOUNTER (EMERGENCY)
Facility: CLINIC | Age: 33
Discharge: HOME | End: 2023-07-14
Attending: EMERGENCY MEDICINE | Admitting: EMERGENCY MEDICINE
Payer: COMMERCIAL

## 2023-07-14 ENCOUNTER — NURSE TRIAGE (OUTPATIENT)
Dept: FAMILY MEDICINE | Facility: CLINIC | Age: 33
End: 2023-07-14
Payer: COMMERCIAL

## 2023-07-14 VITALS
WEIGHT: 200 LBS | RESPIRATION RATE: 16 BRPM | SYSTOLIC BLOOD PRESSURE: 137 MMHG | OXYGEN SATURATION: 99 % | HEART RATE: 73 BPM | TEMPERATURE: 97.9 F | DIASTOLIC BLOOD PRESSURE: 89 MMHG | BODY MASS INDEX: 30.41 KG/M2

## 2023-07-14 DIAGNOSIS — K52.9 CHRONIC DIARRHEA: ICD-10-CM

## 2023-07-14 DIAGNOSIS — B83.9 WORMS IN STOOL: ICD-10-CM

## 2023-07-14 LAB
ALBUMIN SERPL BCG-MCNC: 4.4 G/DL (ref 3.5–5.2)
ALP SERPL-CCNC: 45 U/L (ref 40–129)
ALT SERPL W P-5'-P-CCNC: 45 U/L (ref 0–70)
ANION GAP SERPL CALCULATED.3IONS-SCNC: 11 MMOL/L (ref 7–15)
AST SERPL W P-5'-P-CCNC: 30 U/L (ref 0–45)
BASOPHILS # BLD AUTO: 0 10E3/UL (ref 0–0.2)
BASOPHILS NFR BLD AUTO: 0 %
BILIRUB SERPL-MCNC: 0.5 MG/DL
BUN SERPL-MCNC: 14 MG/DL (ref 6–20)
CALCIUM SERPL-MCNC: 9 MG/DL (ref 8.6–10)
CHLORIDE SERPL-SCNC: 104 MMOL/L (ref 98–107)
CREAT SERPL-MCNC: 1.08 MG/DL (ref 0.67–1.17)
DEPRECATED HCO3 PLAS-SCNC: 25 MMOL/L (ref 22–29)
EOSINOPHIL # BLD AUTO: 0.1 10E3/UL (ref 0–0.7)
EOSINOPHIL NFR BLD AUTO: 2 %
ERYTHROCYTE [DISTWIDTH] IN BLOOD BY AUTOMATED COUNT: 13.1 % (ref 10–15)
GFR SERPL CREATININE-BSD FRML MDRD: >90 ML/MIN/1.73M2
GLUCOSE SERPL-MCNC: 92 MG/DL (ref 70–99)
HCT VFR BLD AUTO: 45.3 % (ref 40–53)
HGB BLD-MCNC: 15.8 G/DL (ref 13.3–17.7)
HOLD SPECIMEN: NORMAL
IMM GRANULOCYTES # BLD: 0 10E3/UL
IMM GRANULOCYTES NFR BLD: 0 %
LIPASE SERPL-CCNC: 47 U/L (ref 13–60)
LYMPHOCYTES # BLD AUTO: 2.1 10E3/UL (ref 0.8–5.3)
LYMPHOCYTES NFR BLD AUTO: 31 %
MCH RBC QN AUTO: 30.5 PG (ref 26.5–33)
MCHC RBC AUTO-ENTMCNC: 34.9 G/DL (ref 31.5–36.5)
MCV RBC AUTO: 88 FL (ref 78–100)
MONOCYTES # BLD AUTO: 0.7 10E3/UL (ref 0–1.3)
MONOCYTES NFR BLD AUTO: 11 %
NEUTROPHILS # BLD AUTO: 3.8 10E3/UL (ref 1.6–8.3)
NEUTROPHILS NFR BLD AUTO: 56 %
NRBC # BLD AUTO: 0 10E3/UL
NRBC BLD AUTO-RTO: 0 /100
PLATELET # BLD AUTO: 201 10E3/UL (ref 150–450)
POTASSIUM SERPL-SCNC: 4.1 MMOL/L (ref 3.4–5.3)
PROT SERPL-MCNC: 7.1 G/DL (ref 6.4–8.3)
RBC # BLD AUTO: 5.18 10E6/UL (ref 4.4–5.9)
SODIUM SERPL-SCNC: 140 MMOL/L (ref 136–145)
WBC # BLD AUTO: 6.8 10E3/UL (ref 4–11)

## 2023-07-14 PROCEDURE — 85025 COMPLETE CBC W/AUTO DIFF WBC: CPT | Performed by: EMERGENCY MEDICINE

## 2023-07-14 PROCEDURE — 99283 EMERGENCY DEPT VISIT LOW MDM: CPT | Performed by: EMERGENCY MEDICINE

## 2023-07-14 PROCEDURE — 36415 COLL VENOUS BLD VENIPUNCTURE: CPT | Performed by: EMERGENCY MEDICINE

## 2023-07-14 PROCEDURE — 83690 ASSAY OF LIPASE: CPT | Performed by: EMERGENCY MEDICINE

## 2023-07-14 PROCEDURE — 80053 COMPREHEN METABOLIC PANEL: CPT | Performed by: EMERGENCY MEDICINE

## 2023-07-14 ASSESSMENT — ACTIVITIES OF DAILY LIVING (ADL): ADLS_ACUITY_SCORE: 35

## 2023-07-14 NOTE — ED PROVIDER NOTES
History     Chief Complaint   Patient presents with    Abdominal Pain    Diarrhea     HPI  Deuce Cheung is a 32 year old male with no reported past medical history who presents with concern for large worm in stool. Loose stools for several years.  3 to 4/day.  Occasionally bright red blood.  Had what appeared to be a 12 to 14 inch worm in his stool..  With past 2 days has not had any sausage or anything with casings.  Ate potatoes, corn, beef 6 to, chicken sandwich and 40 of fish from [employer].  Has had several months of abdominal pain she says is brief lasting 1 to 2 seconds and sharp in nature.  Happens in different areas of his abdomen including all 4 quadrants.  Occasional nausea no vomiting.  No fevers.  Feels well today..      The patient's PMHx, Surgical Hx, Allergies, and Medications were all reviewed with the patient.    Allergies:  No Known Allergies    Problem List:    There are no problems to display for this patient.       Past Medical History:    History reviewed. No pertinent past medical history.    Past Surgical History:    History reviewed. No pertinent surgical history.    Family History:    Family History   Problem Relation Age of Onset    Celiac Disease Maternal Grandmother        Social History:  Marital Status:   [2]  Social History     Tobacco Use    Smoking status: Former    Smokeless tobacco: Never   Vaping Use    Vaping Use: Some days    Substances: THC    Devices: Pre-filled or refillable cartridge, Refillable tank   Substance Use Topics    Alcohol use: Yes     Alcohol/week: 0.0 standard drinks of alcohol     Comment: social    Drug use: Never        Medications:    No current outpatient medications on file.        Review of Systems  Pertinent positives and negatives mentioned in HPI    Physical Exam   BP: 137/89  Pulse: 73  Temp: 97.9  F (36.6  C)  Resp: 16  Weight: 90.7 kg (200 lb)  SpO2: 99 %    Physical Exam  GEN: Awake, alert, and cooperative. No acute distress.  NECK:  Symmetric, freely mobile.   CV : wwp  PULM: Normal effort.   ABD: Soft, non-tender, non-distended. No rebound or guarding.   NEURO: Normal speech. Following commands.   EXT: No gross deformity. Warm and well perfused.  INT: Warm. No diaphoresis. Normal color.   PSYCH: anxious mood       ED Course        Procedures         Critical Care time:  none               Results for orders placed or performed during the hospital encounter of 07/14/23 (from the past 24 hour(s))   [place] Draw    Narrative    The following orders were created for panel order [place] Draw.  Procedure                               Abnormality         Status                     ---------                               -----------         ------                     Extra Blue Top Tube[[id number]]                              In process                 Extra Green Top (Lithium...[[id number]]                      In process                 Extra Purple Top Tube[[id number]]                            In process                   Please view results for these tests on the individual orders.   CBC with Platelets & Differential    Narrative    The following orders were created for panel order CBC with Platelets & Differential.  Procedure                               Abnormality         Status                     ---------                               -----------         ------                     CBC with platelets and d...[[id number]]                      Final result                 Please view results for these tests on the individual orders.   Comprehensive metabolic panel   Result Value Ref Range    Sodium 140 136 - 145 mmol/L    Potassium 4.1 3.4 - 5.3 mmol/L    Chloride 104 98 - 107 mmol/L    Carbon Dioxide (CO2) 25 22 - 29 mmol/L    Anion Gap 11 7 - 15 mmol/L    Urea Nitrogen 14.0 6.0 - 20.0 mg/dL    Creatinine 1.08 0.67 - 1.17 mg/dL    Calcium 9.0 8.6 - 10.0 mg/dL    Glucose 92 70 - 99 mg/dL    Alkaline Phosphatase 45 40 - 129 U/L    AST 30 0 - 45  U/L    ALT 45 0 - 70 U/L    Protein Total 7.1 6.4 - 8.3 g/dL    Albumin 4.4 3.5 - 5.2 g/dL    Bilirubin Total 0.5 <=1.2 mg/dL    GFR Estimate >90 >60 mL/min/1.73m2   Lipase   Result Value Ref Range    Lipase 47 13 - 60 U/L   CBC with platelets and differential   Result Value Ref Range    WBC Count 6.8 4.0 - 11.0 10e3/uL    RBC Count 5.18 4.40 - 5.90 10e6/uL    Hemoglobin 15.8 13.3 - 17.7 g/dL    Hematocrit 45.3 40.0 - 53.0 %    MCV 88 78 - 100 fL    MCH 30.5 26.5 - 33.0 pg    MCHC 34.9 31.5 - 36.5 g/dL    RDW 13.1 10.0 - 15.0 %    Platelet Count 201 150 - 450 10e3/uL    % Neutrophils 56 %    % Lymphocytes 31 %    % Monocytes 11 %    % Eosinophils 2 %    % Basophils 0 %    % Immature Granulocytes 0 %    NRBCs per 100 WBC 0 <1 /100    Absolute Neutrophils 3.8 1.6 - 8.3 10e3/uL    Absolute Lymphocytes 2.1 0.8 - 5.3 10e3/uL    Absolute Monocytes 0.7 0.0 - 1.3 10e3/uL    Absolute Eosinophils 0.1 0.0 - 0.7 10e3/uL    Absolute Basophils 0.0 0.0 - 0.2 10e3/uL    Absolute Immature Granulocytes 0.0 <=0.4 10e3/uL    Absolute NRBCs 0.0 10e3/uL       Medications - No data to display    Assessments & Plan (with Medical Decision Making)   32 year old male with reported past medical history presents emergency department with concern for large worm in his stool.  Labs obtained prior to my evaluation by nursing protocols and CBC, CMP, lipase all normal.  No complaints of abdominal pain today.  Nontender abdominal exam.  Reported history of chronic loose stools and abdominal pain detailed in HPI.  Will test for ova and parasites.  Patient unable to provide stool sample in department and given containers for stool sample and outpatient order placed.  Will need to follow up with clinic.    I have reviewed the nursing notes.         New Prescriptions    No medications on file       Final diagnoses:   Chronic diarrhea   Worms in stool     Natalio Johnson MD    7/14/2023   [facility] EMERGENCY DEPT    Disclaimer: This  note consists of words and symbols derived from keyboarding and dictation using voice recognition software.  As a result, there may be errors that have gone undetected.  Please consider this when interpreting information found in this note.               Natalio Johnson MD  07/19/23 4610

## 2023-07-14 NOTE — TELEPHONE ENCOUNTER
Patient states had a bowel movement about an hour and a half ago and passed a 14 inch long worm  Patient has had sharp abdominal pains on and off for past 2 months  States has had digestive issues for awhile  No c/o sudden weight loss  C/o nausea    Instructed patient to go to the ED now for further evaluation  Patient verbalized understanding and agreed      Fabby BUTLER, RN      Reason for Disposition    [1] Vomited 2 or more times AND [2] passed a large worm (8-10 inches; 20-25 cm) recently    Additional Information    Negative: White, threadlike 1/4 to 1/2 inch worm (6 to 12 mm)    Protocols used: WORMS - OTHER THAN PPEAURSH-Y-KX

## 2023-07-14 NOTE — ED TRIAGE NOTES
Here concerned for generalized intermittent belly pain, nausea without vomiting & chronic diarrhea with intermittent blood. Seen around a 14inch worm in stool today & now thinks he may have seen some in the past. Diarrhea ongoing for past 2years.     Triage Assessment     Row Name 07/14/23 1761       Triage Assessment (Adult)    Airway WDL WDL       Respiratory WDL    Respiratory WDL WDL       Skin Circulation/Temperature WDL    Skin Circulation/Temperature WDL WDL       Cardiac WDL    Cardiac WDL WDL       Peripheral/Neurovascular WDL    Peripheral Neurovascular WDL WDL       Cognitive/Neuro/Behavioral WDL    Cognitive/Neuro/Behavioral WDL WDL

## 2023-07-15 NOTE — DISCHARGE INSTRUCTIONS
Bring stool sample back to lab.     You will need to follow up with your primary care provider for results and further evaluation.     If your symptoms worsen or you develop new or concerning symptoms, please return to the Emergency Department for further evaluation and treatment.

## 2023-07-27 ENCOUNTER — LAB (OUTPATIENT)
Dept: LAB | Facility: CLINIC | Age: 33
End: 2023-07-27
Payer: COMMERCIAL

## 2023-07-27 DIAGNOSIS — K52.9 CHRONIC DIARRHEA: Primary | ICD-10-CM

## 2023-07-27 DIAGNOSIS — B83.9 WORMS IN STOOL: ICD-10-CM

## 2023-07-31 ENCOUNTER — LAB (OUTPATIENT)
Dept: LAB | Facility: CLINIC | Age: 33
End: 2023-07-31
Payer: COMMERCIAL

## 2023-07-31 DIAGNOSIS — B83.9 WORMS IN STOOL: ICD-10-CM

## 2023-07-31 DIAGNOSIS — K52.9 CHRONIC DIARRHEA: Primary | ICD-10-CM

## 2023-09-08 ENCOUNTER — TRANSFERRED RECORDS (OUTPATIENT)
Dept: HEALTH INFORMATION MANAGEMENT | Facility: CLINIC | Age: 33
End: 2023-09-08

## 2023-09-11 ENCOUNTER — OFFICE VISIT (OUTPATIENT)
Dept: ORTHOPEDICS | Facility: CLINIC | Age: 33
End: 2023-09-11
Payer: OTHER MISCELLANEOUS

## 2023-09-11 ENCOUNTER — ANCILLARY PROCEDURE (OUTPATIENT)
Dept: GENERAL RADIOLOGY | Facility: CLINIC | Age: 33
End: 2023-09-11
Attending: PEDIATRICS
Payer: COMMERCIAL

## 2023-09-11 VITALS — BODY MASS INDEX: 30.41 KG/M2 | WEIGHT: 200 LBS

## 2023-09-11 DIAGNOSIS — M25.532 LEFT WRIST PAIN: ICD-10-CM

## 2023-09-11 DIAGNOSIS — S52.043A: ICD-10-CM

## 2023-09-11 DIAGNOSIS — S52.592A OTHER CLOSED FRACTURE OF DISTAL END OF LEFT RADIUS, INITIAL ENCOUNTER: ICD-10-CM

## 2023-09-11 DIAGNOSIS — S53.105A CLOSED DISLOCATION OF LEFT ELBOW, INITIAL ENCOUNTER: Primary | ICD-10-CM

## 2023-09-11 PROCEDURE — 73080 X-RAY EXAM OF ELBOW: CPT | Mod: TC | Performed by: RADIOLOGY

## 2023-09-11 PROCEDURE — 99204 OFFICE O/P NEW MOD 45 MIN: CPT | Performed by: PEDIATRICS

## 2023-09-11 PROCEDURE — 73110 X-RAY EXAM OF WRIST: CPT | Mod: TC | Performed by: RADIOLOGY

## 2023-09-11 RX ORDER — HYDROCODONE BITARTRATE AND ACETAMINOPHEN 5; 325 MG/1; MG/1
TABLET ORAL
COMMUNITY
Start: 2023-09-08 | End: 2023-10-08

## 2023-09-11 NOTE — PROGRESS NOTES
"ASSESSMENT & PLAN    There are no diagnoses linked to this encounter.  This issue is {ACUTE/CHRONIC:431899} and {IMPROVING WORSENIN}.      {FOLLOW UP PLANS (Optional):520143}    Dana Abdullahi MD  [facility] SPORTS MEDICINE [facility]    -----  Chief Complaint   Patient presents with    Left Elbow - Pain       SUBJECTIVE  Deuce Cheung is a/an 33 year old male who is seen {FSOC CONSULT:433334} for evaluation of ***.     The patient is seen {sjaparent:614637}.  The patient is {HANDDOMINANCE:790275} handed    Onset: {sjadays/weeks/months/years:837418}. {Precipitating Event:188011}  Location of Pain: {side:5002} ***  Worsened by: ***  Better with: ***  Treatments tried: {sjatreatmentoptions:523360}  Associated symptoms: {thassociatedsx:847038}    Orthopedic/Surgical history: {YES - DATE/NO:870088::\"NO\"}  Social History/Occupation: ***      REVIEW OF SYSTEMS:  Review of Systems    OBJECTIVE:  Wt 90.7 kg (200 lb)   BMI 30.41 kg/m     General: healthy, alert and in no distress  Skin: no suspicious lesions or rash.  CV: distal perfusion intact ***  Resp: normal respiratory effort without conversational dyspnea   Psych: normal mood and affect  Gait: {FSOC GAIT:786973::\"NORMAL\"}  Neuro: Normal light sensory exam of *** extremity ***    ***     RADIOLOGY:  Final results and radiologist's interpretation, available in the [facility] health record.  Images were reviewed with the patient in the office today.  My personal interpretation of the performed imaging: ***      {[facility]  Documentation (Optional):126302}  { E&M time (Optional):644069}  {Provider  Link to [facility] Help Grid :757161}     "

## 2023-09-11 NOTE — PROGRESS NOTES
ASSESSMENT & PLAN    Deuce was seen today for pain.    Diagnoses and all orders for this visit:    Closed dislocation of left elbow, initial encounter  -     XR Elbow LT G/E 3 vw  -     Orthopedic  Referral; Future    Other closed fracture of distal end of left radius, initial encounter  -     XR Wrist Left G/E 3 Views; Future  -     Orthopedic  Referral; Future    Displaced fracture of coronoid process of unspecified ulna, initial encounter for closed fracture  -     Orthopedic  Referral; Future      This issue is acute and Unchanged.      ICD-10-CM    1. Closed dislocation of left elbow, initial encounter  S53.105A XR Elbow LT G/E 3 vw     Orthopedic  Referral      2. Other closed fracture of distal end of left radius, initial encounter  S52.592A XR Wrist Left G/E 3 Views     Orthopedic  Referral      3. Displaced fracture of coronoid process of unspecified ulna, initial encounter for closed fracture  S52.043A Orthopedic  Referral        Patient Instructions   Elbow dislocation with larger cornoid fracture, recommend splinting with close follow up with orthopedic surgery.    Left wrist with likely distal radius fracture visible on lateral view, continue current splint. Likely will heal with casting/bracing.    Plan:  - Today's Plan of Care:  Referral to an Orthopedic Surgeon - Upper extremity  Long arm posterior mold splint    Continue with relative rest and activity modification, Ice, Compression, and Elevation.  Can apply ice 10-15 minutes 3-4 times per day as needed. OTC medications as needed.    Work Letter Given    Follow Up: as needed    Concerning signs and symptoms were reviewed and all questions were answered at this time.    Dana Abdullahi MD [facility]  Sports Medicine Physician  [facility]    -----  Chief Complaint   Patient presents with    Left Elbow - Pain       SUBJECTIVE  Deuce Cheung is a/an 33 year old male who is seen as a  self referral for evaluation of left elbow injury.    The patient is seen with their wife.  The patient is Right handed    Onset: 3 day(s) ago. Patient describes injury as he was doing a vertical jump on a box and when he landed the box collapsed beneath him and he fooshed on his left arm. C/o medial elbow pain and dorsal wrist pain.   Location of Pain: medial elbow pain, dorsal wrist pain  Worsened by:   Better with: splint, norco, sling   Treatments tried: rest/activity avoidance, ibuprofen, and casting/splinting/bracing  Associated symptoms: numbness and tingling in the thumb   - went to the ED where elbow was reduced    Orthopedic/Surgical history: YES - Date: shattered left wrist in HS when he was snowboarding   Social History/Occupation: engineering tech      REVIEW OF SYSTEMS:  Review of Systems    OBJECTIVE:  Wt 90.7 kg (200 lb)   BMI 30.41 kg/m     General: healthy, alert and in no distress  Skin: no suspicious lesions or rash.  CV: distal perfusion intact   Resp: normal respiratory effort without conversational dyspnea   Psych: normal mood and affect  Gait: NORMAL  Neuro: Normal light sensory exam of upper extremity    Bilateral Elbow exam:    Inspection:     ecchymosis noted left elbow       edema noted left elbow    Tender:     lateral epicondyle left       medial epicondyle left       antecubital space left    Non-Tender:      remainder of the elbow bilaterally    ROM:      flexion 90 left       extension 120 left       forearm supination minimal left       forearm pronation minimal left    Strength:     limited strength due to pain    Sensation:     intact throughout hand       intact throughout forearm    Bilateral Wrist and Hand exam    Inspection:       Mild swelling    Tender:       distal radius left    Non Tender:       Remainder of wrist    ROM:       Decreased ROM left wrist    Strength:       Limited strength due to pain    Neurovascular:       2+ radial pulses bilaterally with brisk  capillary refill and      normal sensation to light touch in the radial, median and ulnar nerve distributions      RADIOLOGY:  Final results and radiologist's interpretation, available in the [facility] health record.  Images were reviewed with the patient in the office today.  My personal interpretation of the performed imaging:  3 XR views of left elbow reviewed and compared to ER x-rays 9/8/2023 -  reduced elbow dislocation, large cornoid chip fracture visible on lateral view, no significant degenerative change  - will follow official read    3 XR views of right wrist reviewed: distal radius fracture visible on lateral view, no significant degenerative change  - will follow official read      Review of prior external note(s) from - ER visit  Review of the result(s) of each unique test - XRs

## 2023-09-11 NOTE — LETTER
September 11, 2023      Deuce Cheung  [address] [address] [address]        To Whom It May Concern,     Deuce was seen today for a left arm injury.  He may return to work with the following restrictions:  - no use of left arm, wear splint    Off work if unable to work under these restrictions, will be referred to orthopedic surgery.      Sincerely,        Dana Abdullahi MD

## 2023-09-13 ENCOUNTER — TELEPHONE (OUTPATIENT)
Dept: ORTHOPEDICS | Facility: CLINIC | Age: 33
End: 2023-09-13
Payer: COMMERCIAL

## 2023-09-13 NOTE — TELEPHONE ENCOUNTER
Health Call Center     Phone Message     May a detailed message be left on voicemail: Yes     Reason for Call: Pt is referred for   Closed dislocation of left elbow,  Other closed fracture of distal end of left radius, initial encounter   Displaced fracture of coronoid process of unspecified ulna. Please advise appt with appropriate provider within timeframe pt should be seen in. Pt would prefer [facility] location but sooner would better.

## 2023-09-13 NOTE — TELEPHONE ENCOUNTER
Orthopedic/Sports Medicine Fracture Triage    Incoming call/or message from  Patient calls .    Fracture type: Elbow.    The patient is in a  splint.    Date of injury 9/8/2023.    Triaged by: Dr. Jean Baptiste .    Determined to be managed Surgically.    Needs to be seen ASAP.    Additional Comments/information: I spoke with the patient and was able to schedule him with Dr Barber on 9/18 for his elbow injury.    Mechelle Pulliam, ATC

## 2023-09-14 NOTE — TELEPHONE ENCOUNTER
Action September 15, 2023 3:53 PM MT   Action Taken Called [facility], rep: Tessy will push imaging over to [facility] to push to us. Rep will let [facility] know to push to [facility].       DIAGNOSIS:   Closed dislocation of left elbow, initial encounter [S53.105A]  - Primary  Other closed fracture of distal end of left radius, initial encounter [S52.592A]  Displaced fracture of coronoid process of unspecified ulna, initial encounter for closed fracture [S52.043A]   APPOINTMENT DATE: 09/18/2023   NOTES STATUS DETAILS   OFFICE NOTE from referring provider Internal 09/11/2023 - Dana Abdullahi MD - [facility] Sports Med   DISCHARGE REPORT from the ER Care Everywhere 09/08/2023 - [facility] ED   XRAYS (IMAGES & REPORTS) PACS Internal     [facility]:   09/08/2023 - LT Elbow (x2)

## 2023-09-15 DIAGNOSIS — M25.532 WRIST PAIN, LEFT: ICD-10-CM

## 2023-09-15 DIAGNOSIS — M25.522 ELBOW PAIN, LEFT: Primary | ICD-10-CM

## 2023-09-18 ENCOUNTER — PRE VISIT (OUTPATIENT)
Dept: ORTHOPEDICS | Facility: CLINIC | Age: 33
End: 2023-09-18

## 2023-09-18 ENCOUNTER — ANCILLARY PROCEDURE (OUTPATIENT)
Dept: GENERAL RADIOLOGY | Facility: CLINIC | Age: 33
End: 2023-09-18
Attending: STUDENT IN AN ORGANIZED HEALTH CARE EDUCATION/TRAINING PROGRAM
Payer: OTHER MISCELLANEOUS

## 2023-09-18 ENCOUNTER — OFFICE VISIT (OUTPATIENT)
Dept: ORTHOPEDICS | Facility: CLINIC | Age: 33
End: 2023-09-18
Payer: OTHER MISCELLANEOUS

## 2023-09-18 DIAGNOSIS — M25.532 WRIST PAIN, LEFT: ICD-10-CM

## 2023-09-18 DIAGNOSIS — M25.522 ELBOW PAIN, LEFT: ICD-10-CM

## 2023-09-18 DIAGNOSIS — S53.105A CLOSED DISLOCATION OF LEFT ELBOW, INITIAL ENCOUNTER: ICD-10-CM

## 2023-09-18 DIAGNOSIS — S52.043A: ICD-10-CM

## 2023-09-18 DIAGNOSIS — S52.592A OTHER CLOSED FRACTURE OF DISTAL END OF LEFT RADIUS, INITIAL ENCOUNTER: ICD-10-CM

## 2023-09-18 PROCEDURE — 73080 X-RAY EXAM OF ELBOW: CPT | Mod: LT | Performed by: RADIOLOGY

## 2023-09-18 PROCEDURE — 99204 OFFICE O/P NEW MOD 45 MIN: CPT | Performed by: STUDENT IN AN ORGANIZED HEALTH CARE EDUCATION/TRAINING PROGRAM

## 2023-09-18 PROCEDURE — 73110 X-RAY EXAM OF WRIST: CPT | Mod: LT | Performed by: RADIOLOGY

## 2023-09-18 NOTE — NURSING NOTE
Reason For Visit:   Chief Complaint   Patient presents with    Consult     Consult for left elbow dislocation and left wrist fracture 9/8/23       Primary MD: [facility]  Ref. MD: Dr. Abdullahi    Age: 33 year old    ?  No      There were no vitals taken for this visit.      Pain Assessment  Patient Currently in Pain: Yes  0-10 Pain Scale: 6  Primary Pain Location: Elbow (left, and wrist)  Pain Descriptors: Sore, Sharp, Intermittent  Alleviating Factors: NSAIDS    Hand Dominance Evaluation  Hand Dominance: Right          QuickDASH Assessment       No data to display                   Current Outpatient Medications   Medication Sig Dispense Refill    HYDROcodone-acetaminophen (NORCO) 5-325 MG tablet Take 1-2 Tablets by mouth every 6 hours if needed for severe pain. Max Daily Amount: 8 Tablets         No Known Allergies    Maxine Nguyen, ATC

## 2023-09-18 NOTE — LETTER
[facility] [address]  [address] [facility] [address]  [phone]          September 18, 2023    RE:  Deuce Cheung                                                                                                                                                       [address] [address] [facility] [address]            To whom it may concern:    Deuce Cheung is under my professional care for    Closed dislocation of left elbow, initial encounter  Other closed fracture of distal end of left radius, initial encounter  Displaced fracture of coronoid process of unspecified ulna, initial encounter for closed fracture He  may return to work with the following: The employee is ABLE to return to work today.    When the patient returns to work, the following restrictions apply until MD follow up and reassessment in 6 weeks:  1-2 lbs. Weight bearing restriction on left upper extremity.      Sincerely,        Gamaliel Barber MD

## 2023-09-18 NOTE — LETTER
9/18/2023         RE: Deuce Cheung  [address] [address] [facility] [address]        Dear Colleague,    Thank you for referring your patient, Deuce Cheung, to the [facility] [place]. Please see a copy of my visit note below.    Orthopaedic Surgery Hand and Upper Extremity Clinic H&P Note:  Date: Sep 18, 2023    Patient Name: Deuce Cheung  MRN: [id number]    Consult requested by: Dana Abdullahi    CHIEF COMPLAINT: left elbow and wrist pain    Dominant Hand: right  Occupation:       HPI:  Mr. Deuce Cheung is a 33 year old male right hand dominant who presents with left elbow and wrist pain. On 9/8/23 he fell backwards off an elevated surface at work on his left outstretched hand. He noticed immediate pain and inability to bear weight. He went to the ED where his elbow was closed reduced and he was given a splint and sling. He has been coming out of the splint and working on ROM on his own. He can already achieve near full extension. Right now, he says his pain is about a 6/10. He has noticed occasional twinges of numbness and tingling in the hands which is improving.       PMH  Diabetes no  Thyroid Problems no   Smoking Yes      PAST MEDICAL HISTORY:  No past medical history on file.    PAST SURGICAL HISTORY:  No past surgical history on file.    MEDICATIONS:  Current Outpatient Medications   Medication    HYDROcodone-acetaminophen (NORCO) 5-325 MG tablet     No current facility-administered medications for this visit.       ALLERGIES:   No Known Allergies    FAMILY HISTORY:  No pertinent family history    SOCIAL HISTORY:  Social History     Tobacco Use    Smoking status: Former    Smokeless tobacco: Never   Vaping Use    Vaping Use: Some days    Substances: THC    Devices: Pre-filled or refillable cartridge, Refillable tank   Substance Use Topics    Alcohol use: Yes     Alcohol/week: 0.0 standard drinks of alcohol     Comment: social    Drug use:  Never       The patient's past medical, family, and social history was reviewed and confirmed.    REVIEW OF SYMPTOMS:      General: Negative   Eyes: Negative   Ear, Nose and Throat: Negative   Respiratory: Negative   Cardiovascular: Negative   Gastrointestinal: Negative   Genito-urinary: Negative   Musculoskeletal: Negative  Neurological: Negative   Psychological: Negative  HEME: Negative   ENDO: Negative   SKIN: Negative    VITALS:  There were no vitals filed for this visit.    EXAM:  General: NAD, A&Ox3  HEENT: NC/AT  CV: RRR by peripheral pulse  Pulmonary: Non-labored breathing on RA    Left Upper Extremity:    Inspection:  There is no evidence of open wounds.   There is no evidence of erythema or infection  There is no appreciable swelling or synovitis.  There is not appreciable intrinsic atrophy  There is well maintained thenar musculature    Palpation:  There is no palpable fluctuance  There is tenderness to palpation at at the medial side of the elbow and dorsal radial aspect over distal radius. No tenderness over distal ulna. No lateral elbow sided tenderness      Examination Maneuvers:  Pivot shift is negative, no apprehension  Finklestein's test is positive    Motor:  5/5 EPL, FPL, HI    Sensory:  Intact to light touch in the median, radial, and ulnar nerve distributions        (Elbow): (Left)  Flexion: 100 degrees  Extension: 10 degrees  Pronation: full  Supination: full  No mechanical block                 IMAGING:    Xray left elbow 9/18/23  - coronoid process fx, joint spaces maintained. Possible cortical irregularity of radial head    Xray left wrist 9/8/23  Cortical avulsion fx off dorsum of distal radius, better appreciated on injury films. No  change in alignment.    I have personally reviewed the above images and labs.         IMPRESSION AND RECOMMENDATIONS:  Mr. Deuce Cheung is a 33 year old male right hand dominant with left elbow dislocation and avulsion fx of dorsum of left distal  radius.    Patient already can nearly achieve extension in supination. No apprehension with pivot shift but elbow moderately stiff. No mechanical block to motion.    Elbow appears stable at this time. Begin dedicated hand therapy for ROM of elbow. 1-2 lb weight bearing restriction for 6 weeks post injury. Wean sling.    MRI of left elbow ordered to evaluate extent of ligamentous damage.    All questions answered. He voiced understanding and agreement. Follow-up 6 weeks for re-check. Xrs of left elbow and left wrist needed at that time.    Gamaliel Barber MD    Hand, Upper Extremity & Microvascular Surgery  Department of Orthopaedic Surgery  [facility]

## 2023-09-18 NOTE — PROGRESS NOTES
[facility]opa[facility] H&P Note:  Date: Sep 18, 2023    Patient Name: Deuce Cheung  MRN: [id number]    Consult requested by: Dana Abdullahi    CHIEF COMPLAINT: left elbow and wrist pain    Dominant Hand: right  Occupation:       HPI:  Mr. Deuce Cheung is a 33 year old male right hand dominant who presents with left elbow and wrist pain. On 9/8/23 he fell backwards off an elevated surface at work on his left outstretched hand. He noticed immediate pain and inability to bear weight. He went to the ED where his elbow was closed reduced and he was given a splint and sling. He has been coming out of the splint and working on ROM on his own. He can already achieve near full extension. Right now, he says his pain is about a 6/10. He has noticed occasional twinges of numbness and tingling in the hands which is improving.       PMH  Diabetes no  Thyroid Problems no   Smoking Yes      PAST MEDICAL HISTORY:  No past medical history on file.    PAST SURGICAL HISTORY:  No past surgical history on file.    MEDICATIONS:  Current Outpatient Medications   Medication    HYDROcodone-acetaminophen (NORCO) 5-325 MG tablet     No current facility-administered medications for this visit.       ALLERGIES:   No Known Allergies    FAMILY HISTORY:  No pertinent family history    SOCIAL HISTORY:  Social History     Tobacco Use    Smoking status: Former    Smokeless tobacco: Never   Vaping Use    Vaping Use: Some days    Substances: THC    Devices: Pre-filled or refillable cartridge, Refillable tank   Substance Use Topics    Alcohol use: Yes     Alcohol/week: 0.0 standard drinks of alcohol     Comment: social    Drug use: Never       The patient's past medical, family, and social history was reviewed and confirmed.    REVIEW OF SYMPTOMS:      General: Negative   Eyes: Negative   Ear, Nose and Throat: Negative   Respiratory: Negative   Cardiovascular: Negative   Gastrointestinal:  Negative   Genito-urinary: Negative   Musculoskeletal: Negative  Neurological: Negative   Psychological: Negative  HEME: Negative   ENDO: Negative   SKIN: Negative    VITALS:  There were no vitals filed for this visit.    EXAM:  General: NAD, A&Ox3  HEENT: NC/AT  CV: RRR by peripheral pulse  Pulmonary: Non-labored breathing on RA    Left Upper Extremity:    Inspection:  There is no evidence of open wounds.   There is no evidence of erythema or infection  There is no appreciable swelling or synovitis.  There is not appreciable intrinsic atrophy  There is well maintained thenar musculature    Palpation:  There is no palpable fluctuance  There is tenderness to palpation at at the medial side of the elbow and dorsal radial aspect over distal radius. No tenderness over distal ulna. No lateral elbow sided tenderness      Examination Maneuvers:  Pivot shift is negative, no apprehension  Finklestein's test is positive    Motor:  5/5 EPL, FPL, HI    Sensory:  Intact to light touch in the median, radial, and ulnar nerve distributions        (Elbow): (Left)  Flexion: 100 degrees  Extension: 10 degrees  Pronation: full  Supination: full  No mechanical block                 IMAGING:    Xray left elbow 9/18/23  - coronoid process fx, joint spaces maintained. Possible cortical irregularity of radial head    Xray left wrist 9/8/23  Cortical avulsion fx off dorsum of distal radius, better appreciated on injury films. No  change in alignment.    I have personally reviewed the above images and labs.         IMPRESSION AND RECOMMENDATIONS:  Mr. Deuce Cheung is a 33 year old male right hand dominant with left elbow dislocation and avulsion fx of dorsum of left distal radius.    Patient already can nearly achieve extension in supination. No apprehension with pivot shift but elbow moderately stiff. No mechanical block to motion.    Elbow appears stable at this time. Begin dedicated hand therapy for ROM of elbow. 1-2 lb weight bearing  restriction for 6 weeks post injury. Wean sling.    MRI of left elbow ordered to evaluate extent of ligamentous damage.    All questions answered. He voiced understanding and agreement. Follow-up 6 weeks for re-check. Xrs of left elbow and left wrist needed at that time.    Gamaliel Barber MD    Hand, Upper Extremity & Microvascular Surgery  Department of Orthopaedic Surgery  [facility]

## 2023-09-22 ENCOUNTER — TELEPHONE (OUTPATIENT)
Dept: ORTHOPEDICS | Facility: CLINIC | Age: 33
End: 2023-09-22
Payer: COMMERCIAL

## 2023-09-22 NOTE — TELEPHONE ENCOUNTER
Faxed and returned phone call to Karla at [facility] confirming fax.    Ralph Zuniga, EMT on 9/22/2023 at 3:30 PM

## 2023-09-22 NOTE — TELEPHONE ENCOUNTER
[facility] Call Center    Phone Message    May a detailed message be left on voicemail: yes     Reason for Call: Order(s): Other:   Reason for requested: Copy of MRI order for left elbow. Please fax order to fax#: [phone]  Date needed: asa  Provider name: Gamaliel Barber    Action Taken: Message routed to:  [facility] ([facility]): Orthopedics    Travel Screening: Not Applicable

## 2023-09-28 ENCOUNTER — TRANSFERRED RECORDS (OUTPATIENT)
Dept: HEALTH INFORMATION MANAGEMENT | Facility: CLINIC | Age: 33
End: 2023-09-28
Payer: COMMERCIAL

## 2023-09-29 ENCOUNTER — TELEPHONE (OUTPATIENT)
Dept: ORTHOPEDICS | Facility: CLINIC | Age: 33
End: 2023-09-29
Payer: COMMERCIAL

## 2023-09-29 NOTE — TELEPHONE ENCOUNTER
PT plan of care received via fax from [facility] in [place]. Document placed in provider basket for review/ signature.     Thea Gallagher MSA, ATC  Certified Athletic Trainer

## 2023-10-10 NOTE — TELEPHONE ENCOUNTER
Order signed and faxed. Document placed in scan bin in [place].     Thea Gallagher MSA, ATC  Certified Athletic Trainer

## 2023-10-11 ENCOUNTER — TRANSFERRED RECORDS (OUTPATIENT)
Dept: HEALTH INFORMATION MANAGEMENT | Facility: CLINIC | Age: 33
End: 2023-10-11
Payer: COMMERCIAL

## 2023-10-26 ENCOUNTER — TRANSFERRED RECORDS (OUTPATIENT)
Dept: HEALTH INFORMATION MANAGEMENT | Facility: CLINIC | Age: 33
End: 2023-10-26
Payer: COMMERCIAL

## 2023-10-27 ENCOUNTER — TELEPHONE (OUTPATIENT)
Dept: ORTHOPEDICS | Facility: CLINIC | Age: 33
End: 2023-10-27
Payer: COMMERCIAL

## 2023-10-27 DIAGNOSIS — S53.105A CLOSED DISLOCATION OF LEFT ELBOW, INITIAL ENCOUNTER: Primary | ICD-10-CM

## 2023-10-27 DIAGNOSIS — S52.592A OTHER CLOSED FRACTURE OF DISTAL END OF LEFT RADIUS, INITIAL ENCOUNTER: ICD-10-CM

## 2023-11-08 ENCOUNTER — TRANSFERRED RECORDS (OUTPATIENT)
Dept: HEALTH INFORMATION MANAGEMENT | Facility: CLINIC | Age: 33
End: 2023-11-08
Payer: COMMERCIAL

## 2023-12-29 ENCOUNTER — TELEPHONE (OUTPATIENT)
Dept: ORTHOPEDICS | Facility: CLINIC | Age: 33
End: 2023-12-29
Payer: COMMERCIAL

## 2023-12-29 NOTE — TELEPHONE ENCOUNTER
Received a PT discharge summary from [facility].     Summary was for our records, no action needed from our staff. Sent to scan into patient's chart.     Adilia Su, SAYDA, LAT, ATC  Certified Athletic Trainer

## 2024-02-16 NOTE — PROGRESS NOTES
"    Assessment & Plan     Diarrhea, unspecified type  6 months +  - Ova and Parasite Exam Routine; Future  - Colonoscopy Screening  Referral; Future  - Ova and Parasite Exam Routine  Reports seeing a worm x 2 previously in stool. Will check OP in stool. Diarrhea without change for long term without full knowledge of family history could consider colonoscopy as blood is present.   To increase fiber and limit fast food.   Could has stress relation if no other cause found.    Blood in stool  Without pain   - CBC with platelets and differential; Future  Bright red blood without black stools. Consistent, likely hemorrhoid that patient has. Will try prep H first.   No pain. Could consider CT abdomen if pain develops with longstanding diarrhea if hemorrhoid treatment is not successful.    Gastroesophageal reflux disease with esophagitis, unspecified whether hemorrhage  New   - omeprazole (PRILOSEC) 20 MG DR capsule; Take 1 capsule (20 mg) by mouth daily  Trial omeprazole for 1 month    Dizziness  Exacerbated randomly per patient. Tends to have movement correlation although no pattern as to when it happens. Happened previously with syncope and heart causes were ruled out. Deemed to be anxiety.  Unclear etiology at this time without clear pattern- does have some ringing in ears at times, appears unlikely to be menieres although possible.  Does happen with sexual climax nearly every time.  Blood pressure and blood sugar normal limits.      45 minutes of time spent doing chart review, history and exam, documentation, counseling, education, coordination of care, and other activities as described above       BMI  Estimated body mass index is 31.32 kg/m  as calculated from the following:    Height as of this encounter: 1.727 m (5' 8\").    Weight as of this encounter: 93.4 kg (206 lb).   Weight management plan: Discussed healthy diet and exercise guidelines      Follow up as needed       Subjective   Deuce is a 33 year " "old, presenting for the following health issues:  Rectal Problem (/)            2/21/2024     2:44 PM   Additional Questions   Roomed by Margret Oswald MA   Accompanied by Spouse       Whooshing and siren sounds tunnel vision.  Sexual activity makes symptoms worse most       History of Present Illness       Reason for visit:  Rectal bleeding  Symptom onset:  More than a month  Symptoms include:  Blood in feces  Symptom intensity:  Moderate  Symptom progression:  Worsening  Had these symptoms before:  No  What makes it worse:  Nope  What makes it better:  Nope    He eats 0-1 servings of fruits and vegetables daily.He consumes 1 sweetened beverage(s) daily.He exercises with enough effort to increase his heart rate 30 to 60 minutes per day.  He exercises with enough effort to increase his heart rate 3 or less days per week.   He is taking medications regularly.         Review of Systems  Constitutional, neuro, ENT, endocrine, pulmonary, cardiac, gastrointestinal, genitourinary, musculoskeletal, integument and psychiatric systems are negative, except as otherwise noted.        Objective    /84   Pulse 81   Temp 97.7  F (36.5  C) (Tympanic)   Resp 14   Ht 1.727 m (5' 8\")   Wt 93.4 kg (206 lb)   SpO2 97%   BMI 31.32 kg/m    Body mass index is 31.32 kg/m .      Physical Exam   GENERAL: alert and no distress  EYES: Eyes grossly normal to inspection, PERRL and conjunctivae and sclerae normal  HENT: ear canals and TM's normal, nose and mouth without ulcers or lesions  NECK: no adenopathy, no asymmetry, masses, or scars  RESP: lungs clear to auscultation - no rales, rhonchi or wheezes  CV: regular rate and rhythm, normal S1 S2, no S3 or S4, no murmur, click or rub, no peripheral edema  ABDOMEN: soft, nontender, no hepatosplenomegaly, no masses and bowel sounds normal  MS: no gross musculoskeletal defects noted, no edema  SKIN: no suspicious lesions or rashes  NEURO: Normal strength and tone, mentation intact and speech " normal  PSYCH: mentation appears normal, affect normal/bright            Signed Electronically by: Cameron Garcia PA-C

## 2024-02-21 ENCOUNTER — OFFICE VISIT (OUTPATIENT)
Dept: FAMILY MEDICINE | Facility: CLINIC | Age: 34
End: 2024-02-21
Payer: COMMERCIAL

## 2024-02-21 VITALS
TEMPERATURE: 97.7 F | RESPIRATION RATE: 14 BRPM | SYSTOLIC BLOOD PRESSURE: 127 MMHG | HEIGHT: 68 IN | WEIGHT: 206 LBS | OXYGEN SATURATION: 97 % | BODY MASS INDEX: 31.22 KG/M2 | DIASTOLIC BLOOD PRESSURE: 84 MMHG | HEART RATE: 81 BPM

## 2024-02-21 DIAGNOSIS — R19.7 DIARRHEA, UNSPECIFIED TYPE: Primary | ICD-10-CM

## 2024-02-21 DIAGNOSIS — K21.00 GASTROESOPHAGEAL REFLUX DISEASE WITH ESOPHAGITIS, UNSPECIFIED WHETHER HEMORRHAGE: ICD-10-CM

## 2024-02-21 DIAGNOSIS — R42 DIZZINESS: ICD-10-CM

## 2024-02-21 DIAGNOSIS — B83.9 WORMS IN STOOL: ICD-10-CM

## 2024-02-21 DIAGNOSIS — K92.1 BLOOD IN STOOL: ICD-10-CM

## 2024-02-21 DIAGNOSIS — K52.9 CHRONIC DIARRHEA: ICD-10-CM

## 2024-02-21 LAB
BASOPHILS # BLD AUTO: 0 10E3/UL (ref 0–0.2)
BASOPHILS NFR BLD AUTO: 0 %
EOSINOPHIL # BLD AUTO: 0.1 10E3/UL (ref 0–0.7)
EOSINOPHIL NFR BLD AUTO: 2 %
ERYTHROCYTE [DISTWIDTH] IN BLOOD BY AUTOMATED COUNT: 12.9 % (ref 10–15)
HCT VFR BLD AUTO: 47 % (ref 40–53)
HGB BLD-MCNC: 16.2 G/DL (ref 13.3–17.7)
IMM GRANULOCYTES # BLD: 0 10E3/UL
IMM GRANULOCYTES NFR BLD: 0 %
LYMPHOCYTES # BLD AUTO: 2.2 10E3/UL (ref 0.8–5.3)
LYMPHOCYTES NFR BLD AUTO: 32 %
MCH RBC QN AUTO: 30.6 PG (ref 26.5–33)
MCHC RBC AUTO-ENTMCNC: 34.5 G/DL (ref 31.5–36.5)
MCV RBC AUTO: 89 FL (ref 78–100)
MONOCYTES # BLD AUTO: 0.7 10E3/UL (ref 0–1.3)
MONOCYTES NFR BLD AUTO: 11 %
NEUTROPHILS # BLD AUTO: 3.7 10E3/UL (ref 1.6–8.3)
NEUTROPHILS NFR BLD AUTO: 55 %
PLATELET # BLD AUTO: 228 10E3/UL (ref 150–450)
RBC # BLD AUTO: 5.29 10E6/UL (ref 4.4–5.9)
WBC # BLD AUTO: 6.8 10E3/UL (ref 4–11)

## 2024-02-21 PROCEDURE — 85025 COMPLETE CBC W/AUTO DIFF WBC: CPT | Performed by: PHYSICIAN ASSISTANT

## 2024-02-21 PROCEDURE — 36415 COLL VENOUS BLD VENIPUNCTURE: CPT | Performed by: PHYSICIAN ASSISTANT

## 2024-02-21 PROCEDURE — 99215 OFFICE O/P EST HI 40 MIN: CPT | Performed by: PHYSICIAN ASSISTANT

## 2024-02-21 PROCEDURE — 84443 ASSAY THYROID STIM HORMONE: CPT | Performed by: PHYSICIAN ASSISTANT

## 2024-02-21 ASSESSMENT — PAIN SCALES - GENERAL: PAINLEVEL: NO PAIN (0)

## 2024-02-22 LAB — TSH SERPL DL<=0.005 MIU/L-ACNC: 1.67 UIU/ML (ref 0.3–4.2)

## 2024-03-01 PROCEDURE — 87209 SMEAR COMPLEX STAIN: CPT | Performed by: PHYSICIAN ASSISTANT

## 2024-03-01 PROCEDURE — 87177 OVA AND PARASITES SMEARS: CPT | Performed by: PHYSICIAN ASSISTANT

## 2024-03-04 LAB
O+P STL MICRO: NEGATIVE
O+P STL MICRO: NEGATIVE

## 2024-06-29 ENCOUNTER — HEALTH MAINTENANCE LETTER (OUTPATIENT)
Age: 34
End: 2024-06-29

## 2025-07-01 ENCOUNTER — OFFICE VISIT (OUTPATIENT)
Dept: FAMILY MEDICINE | Facility: CLINIC | Age: 35
End: 2025-07-01
Payer: COMMERCIAL

## 2025-07-01 VITALS
SYSTOLIC BLOOD PRESSURE: 125 MMHG | WEIGHT: 201 LBS | BODY MASS INDEX: 30.46 KG/M2 | OXYGEN SATURATION: 99 % | DIASTOLIC BLOOD PRESSURE: 85 MMHG | TEMPERATURE: 98 F | HEART RATE: 69 BPM | HEIGHT: 68 IN | RESPIRATION RATE: 16 BRPM

## 2025-07-01 DIAGNOSIS — K92.1 HEMATOCHEZIA: ICD-10-CM

## 2025-07-01 DIAGNOSIS — K52.9 CHRONIC DIARRHEA: Primary | ICD-10-CM

## 2025-07-01 DIAGNOSIS — L21.9 SEBORRHEIC DERMATITIS: ICD-10-CM

## 2025-07-01 DIAGNOSIS — K21.9 GASTROESOPHAGEAL REFLUX DISEASE WITHOUT ESOPHAGITIS: ICD-10-CM

## 2025-07-01 DIAGNOSIS — L30.9 ECZEMA, UNSPECIFIED TYPE: ICD-10-CM

## 2025-07-01 LAB
ALBUMIN SERPL BCG-MCNC: 4.3 G/DL (ref 3.5–5.2)
ALP SERPL-CCNC: 43 U/L (ref 40–150)
ALT SERPL W P-5'-P-CCNC: 51 U/L (ref 0–70)
ANION GAP SERPL CALCULATED.3IONS-SCNC: 11 MMOL/L (ref 7–15)
AST SERPL W P-5'-P-CCNC: 35 U/L (ref 0–45)
BASOPHILS # BLD AUTO: 0 10E3/UL (ref 0–0.2)
BASOPHILS NFR BLD AUTO: 0 %
BILIRUB SERPL-MCNC: 0.6 MG/DL
BUN SERPL-MCNC: 12.7 MG/DL (ref 6–20)
CALCIUM SERPL-MCNC: 9.1 MG/DL (ref 8.8–10.4)
CHLORIDE SERPL-SCNC: 105 MMOL/L (ref 98–107)
CREAT SERPL-MCNC: 0.99 MG/DL (ref 0.67–1.17)
CRP SERPL-MCNC: <3 MG/L
EGFRCR SERPLBLD CKD-EPI 2021: >90 ML/MIN/1.73M2
EOSINOPHIL # BLD AUTO: 0.2 10E3/UL (ref 0–0.7)
EOSINOPHIL NFR BLD AUTO: 4 %
ERYTHROCYTE [DISTWIDTH] IN BLOOD BY AUTOMATED COUNT: 13 % (ref 10–15)
ERYTHROCYTE [SEDIMENTATION RATE] IN BLOOD BY WESTERGREN METHOD: 3 MM/HR (ref 0–15)
GLUCOSE SERPL-MCNC: 105 MG/DL (ref 70–99)
HCO3 SERPL-SCNC: 25 MMOL/L (ref 22–29)
HCT VFR BLD AUTO: 48.1 % (ref 40–53)
HGB BLD-MCNC: 16.4 G/DL (ref 13.3–17.7)
IMM GRANULOCYTES # BLD: 0 10E3/UL
IMM GRANULOCYTES NFR BLD: 0 %
IRON BINDING CAPACITY (ROCHE): 351 UG/DL (ref 240–430)
IRON SATN MFR SERPL: 26 % (ref 15–46)
IRON SERPL-MCNC: 93 UG/DL (ref 61–157)
LYMPHOCYTES # BLD AUTO: 1.7 10E3/UL (ref 0.8–5.3)
LYMPHOCYTES NFR BLD AUTO: 37 %
MCH RBC QN AUTO: 29.7 PG (ref 26.5–33)
MCHC RBC AUTO-ENTMCNC: 34.1 G/DL (ref 31.5–36.5)
MCV RBC AUTO: 87 FL (ref 78–100)
MONOCYTES # BLD AUTO: 0.5 10E3/UL (ref 0–1.3)
MONOCYTES NFR BLD AUTO: 11 %
NEUTROPHILS # BLD AUTO: 2.2 10E3/UL (ref 1.6–8.3)
NEUTROPHILS NFR BLD AUTO: 47 %
PLATELET # BLD AUTO: 219 10E3/UL (ref 150–450)
POTASSIUM SERPL-SCNC: 4.4 MMOL/L (ref 3.4–5.3)
PROT SERPL-MCNC: 7 G/DL (ref 6.4–8.3)
RBC # BLD AUTO: 5.53 10E6/UL (ref 4.4–5.9)
SODIUM SERPL-SCNC: 141 MMOL/L (ref 135–145)
WBC # BLD AUTO: 4.6 10E3/UL (ref 4–11)

## 2025-07-01 PROCEDURE — 86364 TISS TRNSGLTMNASE EA IG CLAS: CPT | Performed by: INTERNAL MEDICINE

## 2025-07-01 PROCEDURE — 86140 C-REACTIVE PROTEIN: CPT | Performed by: INTERNAL MEDICINE

## 2025-07-01 PROCEDURE — 99000 SPECIMEN HANDLING OFFICE-LAB: CPT | Performed by: INTERNAL MEDICINE

## 2025-07-01 PROCEDURE — 83550 IRON BINDING TEST: CPT | Performed by: INTERNAL MEDICINE

## 2025-07-01 PROCEDURE — 83540 ASSAY OF IRON: CPT | Performed by: INTERNAL MEDICINE

## 2025-07-01 PROCEDURE — 85025 COMPLETE CBC W/AUTO DIFF WBC: CPT | Performed by: INTERNAL MEDICINE

## 2025-07-01 PROCEDURE — 36415 COLL VENOUS BLD VENIPUNCTURE: CPT | Performed by: INTERNAL MEDICINE

## 2025-07-01 PROCEDURE — 99214 OFFICE O/P EST MOD 30 MIN: CPT | Performed by: INTERNAL MEDICINE

## 2025-07-01 PROCEDURE — 85652 RBC SED RATE AUTOMATED: CPT | Performed by: INTERNAL MEDICINE

## 2025-07-01 PROCEDURE — 86258 DGP ANTIBODY EACH IG CLASS: CPT | Performed by: INTERNAL MEDICINE

## 2025-07-01 PROCEDURE — 86231 EMA EACH IG CLASS: CPT | Mod: 90 | Performed by: INTERNAL MEDICINE

## 2025-07-01 PROCEDURE — 80053 COMPREHEN METABOLIC PANEL: CPT | Performed by: INTERNAL MEDICINE

## 2025-07-01 RX ORDER — TRIAMCINOLONE ACETONIDE 1 MG/ML
LOTION TOPICAL 2 TIMES DAILY
Qty: 60 ML | Refills: 1 | Status: SHIPPED | OUTPATIENT
Start: 2025-07-01

## 2025-07-01 ASSESSMENT — PAIN SCALES - GENERAL: PAINLEVEL_OUTOF10: NO PAIN (0)

## 2025-07-01 NOTE — PROGRESS NOTES
Assessment & Plan     (K52.9) Chronic diarrhea  (primary encounter diagnosis)  Comment: Chronic diarrhea.  Uncertain etiology.  This been going on for years.  He gets ova and parasite studies that were negative.  He had a colonoscopy ordered a year ago.  He did not go through with that.  There is a family history of celiac disease.  No family history of inflammatory bowel disorders.  Plan: Endomysial Antibody IgA by IFA, Tissue         transglutaminase dionisio IgA and IgG, CBC with         platelets and differential, Iron and iron         binding capacity, CRP, inflammation, ESR:         Erythrocyte sedimentation rate, Comprehensive         metabolic panel (BMP + Alb, Alk Phos, ALT, AST,        Total. Bili, TP), Adult GI  Referral -        Procedure Only, Deamidated Gliadin Peptide         Antibody IgA & IgG, CANCELED: Gliadin antibody         IgG ([facility]) we will have him monitor his diet a little bit to see if there is anything that makes a difference.  I did recommend that he completely cut out dairy products to see if that makes a difference.             (K92.1) Hematochezia  Comment: He has had intermittent hematochezia.  He has been told he has had hemorrhoids in the past.  Plan: CBC with platelets and differential, Iron and         iron binding capacity he needs a colonoscopy as above             (K21.9) Gastroesophageal reflux disease without esophagitis  Comment: Noted.  Gets good results with Prilosec  Plan: Continue Prilosec as needed    (L21.9) Seborrheic dermatitis  (L30.9) Eczema, unspecified type  Comment: He has primarily seborrhea in his beard and on the medial aspects of his eyebrows.  He also has some lesions on around the nasolabial fold that looks more like an eczema reaction  Plan: triamcinolone (KENALOG) 0.1 % external lotion         I did talk about using steroids carefully on his face.  I talked about avoiding long-term use.          BMI  Estimated body mass index is 30.56 kg/m  as  "calculated from the following:    Height as of this encounter: 1.727 m (5' 8\").    Weight as of this encounter: 91.2 kg (201 lb).             Tunde Tripathi is a 34 year old, presenting for the following health issues:  Gastrointestinal Problem        7/1/2025     7:11 AM   Additional Questions   Roomed by Jacquie FORD   Accompanied by Wife      History of Present Illness       Reason for visit:  Stomach/skin issues  Symptom onset:  More than a month  Symptoms include:  Diarrhea/ flaky skin/ frequent stomach pains/acid reflux  Symptom intensity:  Moderate  Symptom progression:  Worsening  Had these symptoms before:  No  What makes it worse:  Food?  What makes it better:  No   He is taking medications regularly.      34-year-old male presents with a history of diarrhea.  He has been having trouble with his stomach for several years.  July 7, 2014 2023 he was seen in the emergency room in [place] for diarrhea.  He thought that he noted a worm in his stool.  Stool studies were ordered at the time, but apparently they were placed in the wrong type of container and were on readable.  He was seen in the clinic 2/21/2024 for diarrhea once again.  At that time ova and parasite studies were negative in his stool.  A colonoscopy was ordered, but was not done.  There is a family history of celiac disease in his maternal grandmother.  There is no family history of Crohn's or ulcerative colitis.  He has tried eating a gluten-free diet.  He lasted about 2 weeks.  He did not note much improvement.  He also notes that he drinks a lot of milk.  He does note that milk will irritate his intestines.  He has tried cutting back on his dairy intake without much improvement.  He has noted some blood in his stools.  He is not losing weight.  His blood counts on multiple occasions were normal.  He had lipase studies that were normal.  He had liver studies that were normal.  He also notes problems with reflux.  He has reflux 2-3 times a " "week.  It is worsened by dietary indiscretion.  If he eats late-night meals he often has problems.  He also notes that he has problems with rashes.  He gets a erythematous scaly rash in his eyebrows.  He gets similar rashes in his ear canals.  He does note that he gets some rash and erythema all along his beard.  He has not noted dandruff.  He has not noted rashes in other places.  He specifically denies rashes in his gluteal cleft and in his umbilicus.          Objective    /85   Pulse 69   Temp 98  F (36.7  C) (Tympanic)   Resp 16   Ht 1.727 m (5' 8\")   Wt 91.2 kg (201 lb)   SpO2 99%   BMI 30.56 kg/m    Body mass index is 30.56 kg/m .  Physical Exam   GENERAL: alert and no distress  EYES: Eyes grossly normal to inspection, PERRL and conjunctivae and sclerae normal  HENT: ear canals and TM's normal, nose and mouth without ulcers or lesions  NECK: no adenopathy, no asymmetry, masses, or scars  RESP: lungs clear to auscultation - no rales, rhonchi or wheezes  CV: regular rate and rhythm, normal S1 S2, no S3 or S4, no murmur, click or rub, no peripheral edema  ABDOMEN: soft, nontender, no hepatosplenomegaly, no masses and bowel sounds normal  MS: no gross musculoskeletal defects noted, no edema  SKIN: There are seborrhea present along the medial eyebrows.  On the nasolabial folds there is some erythematous scaly rash.  No evidence of seborrhea on his scalp.  No other areas of eczema.  NEURO: Normal strength and tone, mentation intact and speech normal  PSYCH: mentation appears normal, affect normal/bright            Signed Electronically by: Aurelio Bone MD    "

## 2025-07-02 ENCOUNTER — TELEPHONE (OUTPATIENT)
Dept: GASTROENTEROLOGY | Facility: CLINIC | Age: 35
End: 2025-07-02
Payer: COMMERCIAL

## 2025-07-02 LAB
ENDOMYSIUM IGA TITR SER IF: NORMAL {TITER}
GLIADIN IGA SER-ACNC: 1.3 U/ML
GLIADIN IGG SER-ACNC: <0.6 U/ML
TTG IGA SER-ACNC: 0.4 U/ML
TTG IGG SER-ACNC: <0.6 U/ML

## 2025-07-02 NOTE — TELEPHONE ENCOUNTER
Pre assessment completed for upcoming procedure.   (Please see previous telephone encounter notes for complete details)    I called and spoke with patient       Procedure details:    Procedure date 7/16, approximate arrival time 0805 and facility location reviewed.   Patient is aware that endoscopy team will be calling about 2 days prior to confirm arrival time.    Designated  policy reviewed and that site requests drivers to check in and stay on campus. Instructed to have someone stay 6  hours post procedure.   *Disclaimer - please notify the [facility] RN GI staff with any  issues/concerns.    Medication review:    Medications reviewed. Please see supporting documentation below. Holding recommendations discussed (if applicable).       Prep for procedure:     Procedure prep instructions reviewed.        Any additional information needed:  N/A      Patient verbalized understanding and had no questions or concerns at this time.      Cherise Dozier LPN  Endoscopy Procedure Pre Assessment   [phone] option 3

## 2025-07-02 NOTE — TELEPHONE ENCOUNTER
"Endoscopy Scheduling Screen    Caller: call to patient    Have you had any respiratory illness or flu-like symptoms in the last 10 days?  No    Patient is ACTIVE on "[employer].  Inform patient that all appointment instructions will be sent via "[employer].    Review patient's insurance for any non participating payor.    [employer]    Ordering/Referring Provider: FELISHA FINLEY   (If ordering provider performs procedure, schedule with ordering provider unless otherwise instructed. )    BMI: Estimated body mass index is 30.56 kg/m  as calculated from the following:    Height as of 7/1/25: 1.727 m (5' 8\").    Weight as of 7/1/25: 91.2 kg (201 lb).     Sedation Ordered  moderate sedation.   If patient BMI > 50 do not schedule in [facility].    If patient BMI > 45 do not schedule at [facility].    Are you taking methadone or Suboxone?  NO, No RN review required.    Have you been diagnosed and are being treated for severe PTSD or severe anxiety?  NO, No RN review required.    Are you taking any prescription medications for pain 3 or more times per week?   NO, No RN review required.      Do you have a history of malignant hyperthermia?  No      (Females) Are you currently pregnant?        Have you been diagnosed or told you have pulmonary hypertension?   No      Do you have an LVAD?  No      Have you been told you have moderate to severe sleep apnea?  No.      Have you been told you have COPD, asthma, or any other lung disease?  No      Has your doctor ordered any cardiac tests like echo, angiogram, stress test, ablation, or EKG, that you have not completed yet?  No      Do you  have a history of any heart conditions?  Yes  - cath ablation @age of 8, no dx found per pt    Have you had any hospitalizations  in the last year for heart related issues, for example a stent placement, heart attack, or cardiomyopathy?  No    Do you have any implantable devices in your body (pacemaker, ICD)?  No    Do you take nitroglycerine?  No      Have you ever " "had or are you waiting for an organ transplant?  No. Continue scheduling, no site restrictions.      Have you had a stroke or transient ischemic attack (TIA aka \"mini stroke\") in the last 2 years?   No.      Have you been diagnosed with or been told you have cirrhosis of the liver?   No.      Are you currently on dialysis?   No      Do you need assistance transferring?   No      BMI: Estimated body mass index is 30.56 kg/m  as calculated from the following:    Height as of 7/1/25: 1.727 m (5' 8\").    Weight as of 7/1/25: 91.2 kg (201 lb).     Is patients BMI > 40 and scheduling location UP?  No      Do you take an injectable or oral medication for weight loss or diabetes (excluding insulin)?  No      Do you take the medication Naltrexone?  No      Do you take blood thinners?  No       Prep   Are you currently on dialysis or do you have chronic kidney disease?  No      Do you have a diagnosis of diabetes?  No      Do you have a diagnosis of cystic fibrosis (CF)?  No      On a regular basis do you go 3 -5 days between bowel movements?  No    BMI > 40?  No    Preferred Pharmacy:    [id number] - [place], [place] - [address]  [address] [address] [address]  Phone: [phone] Fax: [phone]      Final Scheduling Details     Procedure scheduled  Colonoscopy    Surgeon:  ADRIAN     Date of procedure:  7/16/25     Pre-OP / PAC:   No - Not required for this site.    Location  [facility] - [facility] - Patient preference.    Sedation   Moderate Sedation - Per order.      Patient Reminders:   You will receive a call from a Nurse to review instructions and health history.  This assessment must be completed prior to your procedure.  Failure to complete the Nurse assessment may result in the procedure being cancelled.      On the day of your procedure, please designate an adult(s) who can drive you home stay with you for the next 24 hours. The medicines used in the exam will " make you sleepy. You will not be able to drive.      You cannot take public transportation, ride share services, or non-medical taxi service without a responsible caregiver.  Medical transport services are allowed with the requirement that a responsible caregiver will receive you at your destination.  We require that drivers and caregivers are confirmed prior to your procedure.

## 2025-07-02 NOTE — TELEPHONE ENCOUNTER
Pre visit planning completed.      Procedure details:    Patient scheduled for Colonoscopy on 7/16/25.     Approximate arrival time: 0805. Procedure time 0850.   *Ensure patient is aware that endoscopy team will be calling about 2 days prior to procedure date to confirm arrival time as this may change.     Facility location: [facility]; [address], [place] [address]. Check in location: 2nd Floor at Surgery desk.  *Disclaimer: Drivers are to check in with patient and stay on campus during procedure.     Sedation type: Conscious sedation     Pre op exam needed? No.    Indication for procedure: Screening      Chart review:     Electronic implanted devices? No    Recent diagnosis of diverticulitis within the last 6 weeks? No      Medication review:    Diabetic? No    Anticoagulants? No    Weight loss medication/injectable? No GLP-1 medication per patient's medication list. Nursing to verify with pre-assessment call.    Other medication HOLDING recommendations:  N/A      Prep for procedure:     Bowel prep recommendation: Standard Miralax.   Due to: standard bowel prep    Procedure information and instructions sent via [employer]         Corinne Kliber, RN  Endoscopy Procedure Pre Assessment   [phone] option 3

## 2025-07-05 ENCOUNTER — TRANSFERRED RECORDS (OUTPATIENT)
Dept: HEALTH INFORMATION MANAGEMENT | Facility: CLINIC | Age: 35
End: 2025-07-05
Payer: COMMERCIAL

## 2025-07-13 ENCOUNTER — HEALTH MAINTENANCE LETTER (OUTPATIENT)
Age: 35
End: 2025-07-13

## 2025-07-15 RX ORDER — ONDANSETRON 4 MG/1
4 TABLET, ORALLY DISINTEGRATING ORAL EVERY 6 HOURS PRN
Status: CANCELLED | OUTPATIENT
Start: 2025-07-15

## 2025-07-15 RX ORDER — NALOXONE HYDROCHLORIDE 0.4 MG/ML
0.2 INJECTION, SOLUTION INTRAMUSCULAR; INTRAVENOUS; SUBCUTANEOUS
Status: CANCELLED | OUTPATIENT
Start: 2025-07-15

## 2025-07-15 RX ORDER — NALOXONE HYDROCHLORIDE 0.4 MG/ML
0.4 INJECTION, SOLUTION INTRAMUSCULAR; INTRAVENOUS; SUBCUTANEOUS
Status: CANCELLED | OUTPATIENT
Start: 2025-07-15

## 2025-07-15 RX ORDER — ONDANSETRON 2 MG/ML
4 INJECTION INTRAMUSCULAR; INTRAVENOUS EVERY 6 HOURS PRN
Status: CANCELLED | OUTPATIENT
Start: 2025-07-15

## 2025-07-15 RX ORDER — FLUMAZENIL 0.1 MG/ML
0.2 INJECTION, SOLUTION INTRAVENOUS
Status: CANCELLED | OUTPATIENT
Start: 2025-07-15 | End: 2025-07-16

## 2025-07-15 RX ORDER — PROCHLORPERAZINE MALEATE 10 MG
10 TABLET ORAL EVERY 6 HOURS PRN
Status: CANCELLED | OUTPATIENT
Start: 2025-07-15

## 2025-07-16 ENCOUNTER — HOSPITAL ENCOUNTER (OUTPATIENT)
Facility: AMBULATORY SURGERY CENTER | Age: 35
Discharge: HOME | End: 2025-07-16
Attending: INTERNAL MEDICINE
Payer: COMMERCIAL

## 2025-07-16 ENCOUNTER — ANESTHESIA EVENT (OUTPATIENT)
Dept: SURGERY | Facility: AMBULATORY SURGERY CENTER | Age: 35
End: 2025-07-16
Payer: COMMERCIAL

## 2025-07-16 ENCOUNTER — RESULTS FOLLOW-UP (OUTPATIENT)
Dept: GASTROENTEROLOGY | Facility: CLINIC | Age: 35
End: 2025-07-16

## 2025-07-16 ENCOUNTER — ANESTHESIA (OUTPATIENT)
Dept: SURGERY | Facility: AMBULATORY SURGERY CENTER | Age: 35
End: 2025-07-16
Payer: COMMERCIAL

## 2025-07-16 VITALS
OXYGEN SATURATION: 98 % | TEMPERATURE: 97 F | HEART RATE: 65 BPM | DIASTOLIC BLOOD PRESSURE: 75 MMHG | RESPIRATION RATE: 16 BRPM | SYSTOLIC BLOOD PRESSURE: 125 MMHG

## 2025-07-16 VITALS — HEART RATE: 79 BPM

## 2025-07-16 LAB — COLONOSCOPY: NORMAL

## 2025-07-16 RX ORDER — PROPOFOL 10 MG/ML
INJECTION, EMULSION INTRAVENOUS PRN
Status: DISCONTINUED | OUTPATIENT
Start: 2025-07-16 | End: 2025-07-16

## 2025-07-16 RX ORDER — LIDOCAINE HYDROCHLORIDE 10 MG/ML
INJECTION, SOLUTION INFILTRATION; PERINEURAL PRN
Status: DISCONTINUED | OUTPATIENT
Start: 2025-07-16 | End: 2025-07-16

## 2025-07-16 RX ORDER — SODIUM CHLORIDE, SODIUM LACTATE, POTASSIUM CHLORIDE, CALCIUM CHLORIDE 600; 310; 30; 20 MG/100ML; MG/100ML; MG/100ML; MG/100ML
INJECTION, SOLUTION INTRAVENOUS CONTINUOUS PRN
Status: COMPLETED | OUTPATIENT
Start: 2025-07-16 | End: 2025-07-16

## 2025-07-16 RX ORDER — ONDANSETRON 2 MG/ML
4 INJECTION INTRAMUSCULAR; INTRAVENOUS
Status: DISCONTINUED | OUTPATIENT
Start: 2025-07-16 | End: 2025-07-17 | Stop reason: HOSPADM

## 2025-07-16 RX ORDER — SODIUM CHLORIDE, SODIUM LACTATE, POTASSIUM CHLORIDE, CALCIUM CHLORIDE 600; 310; 30; 20 MG/100ML; MG/100ML; MG/100ML; MG/100ML
INJECTION, SOLUTION INTRAVENOUS CONTINUOUS PRN
Status: DISCONTINUED | OUTPATIENT
Start: 2025-07-16 | End: 2025-07-16

## 2025-07-16 RX ORDER — PROPOFOL 10 MG/ML
INJECTION, EMULSION INTRAVENOUS CONTINUOUS PRN
Status: DISCONTINUED | OUTPATIENT
Start: 2025-07-16 | End: 2025-07-16

## 2025-07-16 RX ORDER — LIDOCAINE 40 MG/G
CREAM TOPICAL
Status: DISCONTINUED | OUTPATIENT
Start: 2025-07-16 | End: 2025-07-17 | Stop reason: HOSPADM

## 2025-07-16 RX ORDER — SODIUM CHLORIDE, SODIUM LACTATE, POTASSIUM CHLORIDE, CALCIUM CHLORIDE 600; 310; 30; 20 MG/100ML; MG/100ML; MG/100ML; MG/100ML
INJECTION, SOLUTION INTRAVENOUS CONTINUOUS
Status: DISCONTINUED | OUTPATIENT
Start: 2025-07-16 | End: 2025-07-17 | Stop reason: HOSPADM

## 2025-07-16 RX ADMIN — SODIUM CHLORIDE, SODIUM LACTATE, POTASSIUM CHLORIDE, CALCIUM CHLORIDE: 600; 310; 30; 20 INJECTION, SOLUTION INTRAVENOUS at 08:48

## 2025-07-16 RX ADMIN — LIDOCAINE HYDROCHLORIDE 60 MG: 10 INJECTION, SOLUTION INFILTRATION; PERINEURAL at 08:48

## 2025-07-16 RX ADMIN — PROPOFOL 200 MCG/KG/MIN: 10 INJECTION, EMULSION INTRAVENOUS at 08:49

## 2025-07-16 RX ADMIN — PROPOFOL 100 MG: 10 INJECTION, EMULSION INTRAVENOUS at 08:48

## 2025-07-16 NOTE — ANESTHESIA POSTPROCEDURE EVALUATION
Patient: Deuce Cheung    Procedure: Procedure(s):  Colonoscopy  COLONOSCOPY, WITH POLYPECTOMY AND BIOPSY  COLONOSCOPY, FLEXIBLE, WITH LESION REMOVAL USING SNARE  COLONOSCOPY, WITH HEMORRHAGE CONTROL       Anesthesia Type:  MAC    Note:  Disposition: Outpatient   Postop Pain Control: Uneventful            Sign Out: Well controlled pain   PONV: No   Neuro/Psych: Uneventful            Sign Out: Acceptable/Baseline neuro status   Airway/Respiratory: Uneventful            Sign Out: Acceptable/Baseline resp. status   CV/Hemodynamics: Uneventful            Sign Out: Acceptable CV status; No obvious hypovolemia; No obvious fluid overload   Other NRE: NONE   DID A NON-ROUTINE EVENT OCCUR? No           Last vitals:  Vitals Value Taken Time   /73 07/16/25 09:13   Temp 97  F (36.1  C) 07/16/25 09:13   Pulse 72 07/16/25 09:13   Resp 16 07/16/25 09:13   SpO2 95 % 07/16/25 09:13       Electronically Signed By: Nahum Fuller MD  July 16, 2025  9:18 AM

## 2025-07-16 NOTE — H&P
ENDOSCOPY PRE-SEDATION H&P FOR OUTPATIENT PROCEDURES    Deuce Cheung  [id number]  1990    Procedure: colonoscopy    Pre-procedure diagnosis: diarrhea    Past medical history: History reviewed. No pertinent past medical history.    Past surgical history:   Past Surgical History:   Procedure Laterality Date    OTHER SURGICAL HISTORY      cardiac ablation age 8       Current Outpatient Medications   Medication Sig Dispense Refill    triamcinolone (KENALOG) 0.1 % external lotion Apply topically 2 times daily. 60 mL 1     Current Facility-Administered Medications   Medication Dose Route Frequency Provider Last Rate Last Admin    lactated ringers infusion   Intravenous Continuous Nahum Fuller MD        lactated ringers infusion    Continuous PRN Rick Menendez MD 50 mL/hr at 07/16/25 0835 50 mL/hr at 07/16/25 0835    lidocaine (LMX4) kit   Topical Q1H PRN Rick Menendez MD        lidocaine 1 % 0.1-1 mL  0.1-1 mL Other Q1H PRRick Ortiz MD        ondansetron (ZOFRAN) injection 4 mg  4 mg Intravenous Once PRN Rick Menendez MD        ondansetron (ZOFRAN) injection    PRN Rick Menendez MD   4 mg at 07/16/25 0835    sodium chloride (PF) 0.9% PF flush 3 mL  3 mL Intracatheter Q8H LILLY Rick Menendez MD        sodium chloride (PF) 0.9% PF flush 3 mL  3 mL Intracatheter q1 min prn Rick Menendez MD           No Known Allergies    History of Anesthesia/Sedation Problems: no    PHYSICAL EXAMINATION:  Constitutional: aaox3, cooperative, pleasant  Vitals reviewed: BP (P) 123/87   Pulse (P) 71   Temp (P) 97  F (36.1  C) (Temporal)   Resp (P) 16   SpO2 (P) 98%   Wt:   Wt Readings from Last 2 Encounters:   07/01/25 91.2 kg (201 lb)   02/21/24 93.4 kg (206 lb)      Eyes: Sclera anicteric/injected  Ears/nose/mouth/throat: Normal oropharynx without ulcers or exudate, mucus membranes moist, hearing intact  Neck: supple,  thyroid normal size  CV: No edema  Respiratory: Unlabored breathing  Lymph: No submandibular, supraclavicular or inguinal lymphadenopathy  Abd: Nondistended, no masses, nontender  Skin: warm, perfused, no jaundice  Psych: Normal affect  MSK: normal movement on limited exam.    ASA Score: See Provation note    Assessment/Plan:     The patient is an appropriate candidate to receive sedation.    Informed consent was discussed with the patient/family, including the risks, benefits, potential complications and any alternative options associated with sedation.    Patient assessment completed just prior to sedation and while under constant observation by the provider. Condition determined to be adequate for proceeding with sedation.    The specific risks for the procedure were discussed with the patient at the time of informed consent and include but are not limited to perforation which could require surgery, missing significant neoplasm or lesion, hemorrhage and adverse sedative complication.      Rick Menendez MD

## 2025-07-16 NOTE — ANESTHESIA PREPROCEDURE EVALUATION
"Anesthesia Pre-Procedure Evaluation    Patient: Deuce Cheung   MRN: [id number] : 1990          Procedure : Procedure(s):  Colonoscopy         No past medical history on file.   No past surgical history on file.   No Known Allergies   Social History     Tobacco Use    Smoking status: Former    Smokeless tobacco: Never   Substance Use Topics    Alcohol use: Yes     Alcohol/week: 0.0 standard drinks of alcohol     Comment: social      Wt Readings from Last 1 Encounters:   25 91.2 kg (201 lb)        Anesthesia Evaluation            ROS/MED HX  ENT/Pulmonary:       Neurologic:       Cardiovascular:       METS/Exercise Tolerance:     Hematologic:       Musculoskeletal:       GI/Hepatic: Comment: Chronic diarrhea    (+) GERD,                   Renal/Genitourinary:       Endo:       Psychiatric/Substance Use:       Infectious Disease: Comment: Shingles 25      Malignancy:       Other:              Physical Exam  Airway  Cardiovascular - normal exam   Dental   (+) Minor Abnormalities - some fillings, tiny chips      Pulmonary - normal exam      Neurological - normal exam  He appears awake, alert and oriented x3.    Other Findings       OUTSIDE LABS:  CBC:   Lab Results   Component Value Date    WBC 4.6 2025    WBC 6.8 2024    HGB 16.4 2025    HGB 16.2 2024    HCT 48.1 2025    HCT 47.0 2024     2025     2024     BMP:   Lab Results   Component Value Date     2025     2023    POTASSIUM 4.4 2025    POTASSIUM 4.1 2023    CHLORIDE 105 2025    CHLORIDE 104 2023    CO2 25 2025    CO2 25 2023    BUN 12.7 2025    BUN 14.0 2023    CR 0.99 2025    CR 1.08 2023     (H) 2025    GLC 92 2023     COAGS: No results found for: \"PTT\", \"INR\", \"FIBR\"  POC: No results found for: \"BGM\", \"HCG\", \"HCGS\"  HEPATIC:   Lab Results   Component Value Date    ALBUMIN 4.3 " "07/01/2025    PROTTOTAL 7.0 07/01/2025    ALT 51 07/01/2025    AST 35 07/01/2025    ALKPHOS 43 07/01/2025    BILITOTAL 0.6 07/01/2025     OTHER:   Lab Results   Component Value Date    ROSANNA 9.1 07/01/2025    MAG 2.5 (H) 02/02/2017    LIPASE 47 07/14/2023    TSH 1.67 02/21/2024    SED 3 07/01/2025       Anesthesia Plan    ASA Status:  1      NPO Status: NPO Appropriate   Anesthesia Type: MAC.  Airway: natural airway.  Maintenance: TIVA.   Techniques and Equipment:       - Monitoring Plan: standard ASA monitoring     Consents    Anesthesia Plan(s) and associated risks, benefits, and realistic alternatives discussed. Questions answered and patient/representative(s) expressed understanding.     - Discussed:     - Discussed with:  Patient               Postoperative Care         Comments:                   Nahum Fuller MD    I have reviewed the pertinent notes and labs in the chart from the past 30 days and (re)examined the patient.  Any updates or changes from those notes are reflected in this note.    Clinically Significant Risk Factors Present on Admission                             # Obesity: Estimated body mass index is 30.56 kg/m  as calculated from the following:    Height as of 7/1/25: 1.727 m (5' 8\").    Weight as of 7/1/25: 91.2 kg (201 lb).                    "

## 2025-07-16 NOTE — ANESTHESIA CARE TRANSFER NOTE
Patient: Deuce Cheung    Procedure: Procedure(s):  Colonoscopy  COLONOSCOPY, WITH POLYPECTOMY AND BIOPSY  COLONOSCOPY, FLEXIBLE, WITH LESION REMOVAL USING SNARE  COLONOSCOPY, WITH HEMORRHAGE CONTROL       Diagnosis: Chronic diarrhea [K52.9]  Diagnosis Additional Information: No value filed.    Anesthesia Type:   MAC     Note:    Oropharynx: oropharynx clear of all foreign objects and spontaneously breathing  Level of Consciousness: drowsy  Oxygen Supplementation: room air    Independent Airway: airway patency satisfactory and stable  Dentition: dentition unchanged  Vital Signs Stable: post-procedure vital signs reviewed and stable  Report to RN Given: handoff report given  Patient transferred to: Phase II    Handoff Report: Identifed the Patient, Identified the Reponsible Provider, Reviewed the pertinent medical history, Discussed the surgical course, Reviewed Intra-OP anesthesia mangement and issues during anesthesia, Set expectations for post-procedure period and Allowed opportunity for questions and acknowledgement of understanding  Vitals:  Vitals Value Taken Time   BP     Temp     Pulse     Resp     SpO2         Electronically Signed By: PEEWEE Pollard CRNA  July 16, 2025  9:13 AM

## (undated) RX ORDER — ONDANSETRON 2 MG/ML
INJECTION INTRAMUSCULAR; INTRAVENOUS
Status: DISPENSED
Start: 2025-07-16